# Patient Record
Sex: MALE | Race: WHITE | NOT HISPANIC OR LATINO
[De-identification: names, ages, dates, MRNs, and addresses within clinical notes are randomized per-mention and may not be internally consistent; named-entity substitution may affect disease eponyms.]

---

## 2017-01-03 ENCOUNTER — OTHER (OUTPATIENT)
Age: 62
End: 2017-01-03

## 2017-01-18 ENCOUNTER — OTHER (OUTPATIENT)
Age: 62
End: 2017-01-18

## 2017-01-31 ENCOUNTER — APPOINTMENT (OUTPATIENT)
Dept: COLORECTAL SURGERY | Facility: CLINIC | Age: 62
End: 2017-01-31

## 2017-01-31 VITALS
HEIGHT: 69 IN | DIASTOLIC BLOOD PRESSURE: 71 MMHG | TEMPERATURE: 97.9 F | HEART RATE: 59 BPM | WEIGHT: 153 LBS | SYSTOLIC BLOOD PRESSURE: 116 MMHG | BODY MASS INDEX: 22.66 KG/M2

## 2017-01-31 DIAGNOSIS — K57.80 DIVERTICULITIS OF INTESTINE, PART UNSPECIFIED, WITH PERFORATION AND ABSCESS W/OUT BLEEDING: ICD-10-CM

## 2017-09-07 ENCOUNTER — APPOINTMENT (OUTPATIENT)
Dept: COLORECTAL SURGERY | Facility: CLINIC | Age: 62
End: 2017-09-07
Payer: COMMERCIAL

## 2017-09-07 VITALS
HEART RATE: 52 BPM | HEIGHT: 69 IN | DIASTOLIC BLOOD PRESSURE: 84 MMHG | TEMPERATURE: 98.2 F | RESPIRATION RATE: 16 BRPM | BODY MASS INDEX: 22.96 KG/M2 | SYSTOLIC BLOOD PRESSURE: 188 MMHG | WEIGHT: 155 LBS

## 2017-09-07 DIAGNOSIS — R10.2 PELVIC AND PERINEAL PAIN: ICD-10-CM

## 2017-09-07 PROCEDURE — 46600 DIAGNOSTIC ANOSCOPY SPX: CPT

## 2017-09-07 PROCEDURE — 99214 OFFICE O/P EST MOD 30 MIN: CPT | Mod: 25

## 2017-09-08 ENCOUNTER — OUTPATIENT (OUTPATIENT)
Dept: OUTPATIENT SERVICES | Facility: HOSPITAL | Age: 62
LOS: 1 days | End: 2017-09-08
Payer: COMMERCIAL

## 2017-09-08 ENCOUNTER — APPOINTMENT (OUTPATIENT)
Dept: CT IMAGING | Facility: CLINIC | Age: 62
End: 2017-09-08
Payer: COMMERCIAL

## 2017-09-08 DIAGNOSIS — Z90.49 ACQUIRED ABSENCE OF OTHER SPECIFIED PARTS OF DIGESTIVE TRACT: Chronic | ICD-10-CM

## 2017-09-08 DIAGNOSIS — R10.2 PELVIC AND PERINEAL PAIN: ICD-10-CM

## 2017-09-08 DIAGNOSIS — Z00.8 ENCOUNTER FOR OTHER GENERAL EXAMINATION: ICD-10-CM

## 2017-09-08 PROCEDURE — 74177 CT ABD & PELVIS W/CONTRAST: CPT

## 2017-09-08 PROCEDURE — 82565 ASSAY OF CREATININE: CPT

## 2017-09-08 PROCEDURE — 74177 CT ABD & PELVIS W/CONTRAST: CPT | Mod: 26

## 2017-09-12 ENCOUNTER — OTHER (OUTPATIENT)
Age: 62
End: 2017-09-12

## 2017-09-18 ENCOUNTER — EMERGENCY (EMERGENCY)
Facility: HOSPITAL | Age: 62
LOS: 1 days | Discharge: HOME | End: 2017-09-18
Attending: EMERGENCY MEDICINE | Admitting: EMERGENCY MEDICINE
Payer: SELF-PAY

## 2017-09-18 VITALS
RESPIRATION RATE: 15 BRPM | DIASTOLIC BLOOD PRESSURE: 81 MMHG | TEMPERATURE: 98 F | OXYGEN SATURATION: 96 % | HEART RATE: 62 BPM | SYSTOLIC BLOOD PRESSURE: 149 MMHG

## 2017-09-18 VITALS
WEIGHT: 154.98 LBS | SYSTOLIC BLOOD PRESSURE: 163 MMHG | TEMPERATURE: 98 F | HEART RATE: 63 BPM | HEIGHT: 69 IN | OXYGEN SATURATION: 96 % | RESPIRATION RATE: 16 BRPM | DIASTOLIC BLOOD PRESSURE: 82 MMHG

## 2017-09-18 DIAGNOSIS — S60.511A ABRASION OF RIGHT HAND, INITIAL ENCOUNTER: ICD-10-CM

## 2017-09-18 DIAGNOSIS — E11.9 TYPE 2 DIABETES MELLITUS WITHOUT COMPLICATIONS: ICD-10-CM

## 2017-09-18 DIAGNOSIS — Z90.49 ACQUIRED ABSENCE OF OTHER SPECIFIED PARTS OF DIGESTIVE TRACT: Chronic | ICD-10-CM
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
## 2017-09-18 DIAGNOSIS — M54.5 LOW BACK PAIN: ICD-10-CM

## 2017-09-18 DIAGNOSIS — I10 ESSENTIAL (PRIMARY) HYPERTENSION: ICD-10-CM

## 2017-09-18 DIAGNOSIS — E78.5 HYPERLIPIDEMIA, UNSPECIFIED: ICD-10-CM

## 2017-09-18 DIAGNOSIS — M25.571 PAIN IN RIGHT ANKLE AND JOINTS OF RIGHT FOOT: ICD-10-CM
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
## 2017-09-18 PROCEDURE — 99284 EMERGENCY DEPT VISIT MOD MDM: CPT

## 2017-09-18 PROCEDURE — 73130 X-RAY EXAM OF HAND: CPT | Mod: 26,RT

## 2017-09-18 PROCEDURE — 73610 X-RAY EXAM OF ANKLE: CPT

## 2017-09-18 PROCEDURE — 99284 EMERGENCY DEPT VISIT MOD MDM: CPT | Mod: 25

## 2017-09-18 PROCEDURE — 72100 X-RAY EXAM L-S SPINE 2/3 VWS: CPT | Mod: 26

## 2017-09-18 PROCEDURE — 72100 X-RAY EXAM L-S SPINE 2/3 VWS: CPT

## 2017-09-18 PROCEDURE — 73130 X-RAY EXAM OF HAND: CPT

## 2017-09-18 PROCEDURE — 73610 X-RAY EXAM OF ANKLE: CPT | Mod: 26,RT

## 2017-09-18 NOTE — ED ADULT NURSE NOTE - PMH
Diabetes type 2, uncontrolled  2009  Elective surgery  Implantation of Lumbar Stimulator and Fusion for pain 2013  Hyperlipidemia    Hypertension    Prolapsed lumbar disc    Renal cell cancer  2003  Seizure  series of 3 seizures in 1978 after injection of dye for shoulder

## 2017-09-18 NOTE — ED PROVIDER NOTE - PROGRESS NOTE DETAILS
patient resting comfortably, xray results discussed, patient has no open wounds of 4th finger, no visible foreign body.  advised follow up with his pmd and his pain management doctor.  states has pain medication at home , copy of results given
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
## 2017-09-18 NOTE — ED ADULT NURSE NOTE - PSH
H/O rotator cuff surgery  1978  History of cholecystectomy  2015  History of renal carcinoma  Left Nephrectomy  S/P lumbar spinal fusion
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
## 2017-09-18 NOTE — ED PROVIDER NOTE - UPPER EXTREMITY EXAM, RIGHT
TENDERNESS/right hand tenderness along thenar eminence, from, nvi, no deformity, dry abrasion posterior aspect of right thumb

## 2018-04-06 ENCOUNTER — INPATIENT (INPATIENT)
Facility: HOSPITAL | Age: 63
LOS: 0 days | Discharge: HOME | DRG: 309 | End: 2018-04-07
Attending: FAMILY MEDICINE | Admitting: FAMILY MEDICINE
Payer: COMMERCIAL

## 2018-04-06 VITALS
TEMPERATURE: 98 F | HEART RATE: 116 BPM | OXYGEN SATURATION: 97 % | SYSTOLIC BLOOD PRESSURE: 169 MMHG | HEIGHT: 69 IN | RESPIRATION RATE: 16 BRPM | WEIGHT: 160.06 LBS | DIASTOLIC BLOOD PRESSURE: 94 MMHG

## 2018-04-06 DIAGNOSIS — N17.9 ACUTE KIDNEY FAILURE, UNSPECIFIED: ICD-10-CM

## 2018-04-06 DIAGNOSIS — I48.91 UNSPECIFIED ATRIAL FIBRILLATION: ICD-10-CM

## 2018-04-06 DIAGNOSIS — I10 ESSENTIAL (PRIMARY) HYPERTENSION: ICD-10-CM

## 2018-04-06 DIAGNOSIS — R07.9 CHEST PAIN, UNSPECIFIED: ICD-10-CM

## 2018-04-06 DIAGNOSIS — K59.00 CONSTIPATION, UNSPECIFIED: ICD-10-CM

## 2018-04-06 DIAGNOSIS — E78.5 HYPERLIPIDEMIA, UNSPECIFIED: ICD-10-CM

## 2018-04-06 DIAGNOSIS — Z90.49 ACQUIRED ABSENCE OF OTHER SPECIFIED PARTS OF DIGESTIVE TRACT: Chronic | ICD-10-CM

## 2018-04-06 DIAGNOSIS — E11.65 TYPE 2 DIABETES MELLITUS WITH HYPERGLYCEMIA: ICD-10-CM

## 2018-04-06 DIAGNOSIS — Z29.9 ENCOUNTER FOR PROPHYLACTIC MEASURES, UNSPECIFIED: ICD-10-CM

## 2018-04-06 DIAGNOSIS — M51.26 OTHER INTERVERTEBRAL DISC DISPLACEMENT, LUMBAR REGION: ICD-10-CM

## 2018-04-06 LAB
ALBUMIN SERPL ELPH-MCNC: 3.4 G/DL — SIGNIFICANT CHANGE UP (ref 3.3–5)
ALP SERPL-CCNC: 80 U/L — SIGNIFICANT CHANGE UP (ref 40–120)
ALT FLD-CCNC: 29 U/L — SIGNIFICANT CHANGE UP (ref 12–78)
ANION GAP SERPL CALC-SCNC: 9 MMOL/L — SIGNIFICANT CHANGE UP (ref 5–17)
APTT BLD: 37.9 SEC — HIGH (ref 27.5–37.4)
AST SERPL-CCNC: 18 U/L — SIGNIFICANT CHANGE UP (ref 15–37)
BASOPHILS # BLD AUTO: 0 K/UL — SIGNIFICANT CHANGE UP (ref 0–0.2)
BASOPHILS NFR BLD AUTO: 0.5 % — SIGNIFICANT CHANGE UP (ref 0–2)
BILIRUB SERPL-MCNC: 0.3 MG/DL — SIGNIFICANT CHANGE UP (ref 0.2–1.2)
BUN SERPL-MCNC: 26 MG/DL — HIGH (ref 7–23)
CALCIUM SERPL-MCNC: 8.8 MG/DL — SIGNIFICANT CHANGE UP (ref 8.5–10.1)
CHLORIDE SERPL-SCNC: 106 MMOL/L — SIGNIFICANT CHANGE UP (ref 96–108)
CK MB BLD-MCNC: 1.2 % — SIGNIFICANT CHANGE UP (ref 0–3.5)
CK MB CFR SERPL CALC: 0.7 NG/ML — SIGNIFICANT CHANGE UP (ref 0–3.6)
CK SERPL-CCNC: 58 U/L — SIGNIFICANT CHANGE UP (ref 26–308)
CO2 SERPL-SCNC: 27 MMOL/L — SIGNIFICANT CHANGE UP (ref 22–31)
CREAT SERPL-MCNC: 1.4 MG/DL — HIGH (ref 0.5–1.3)
EOSINOPHIL # BLD AUTO: 0.1 K/UL — SIGNIFICANT CHANGE UP (ref 0–0.5)
EOSINOPHIL NFR BLD AUTO: 1.8 % — SIGNIFICANT CHANGE UP (ref 0–6)
GLUCOSE SERPL-MCNC: 234 MG/DL — HIGH (ref 70–99)
HCT VFR BLD CALC: 50.2 % — HIGH (ref 39–50)
HGB BLD-MCNC: 16.3 G/DL — SIGNIFICANT CHANGE UP (ref 13–17)
INR BLD: 1 RATIO — SIGNIFICANT CHANGE UP (ref 0.88–1.16)
LYMPHOCYTES # BLD AUTO: 1.8 K/UL — SIGNIFICANT CHANGE UP (ref 1–3.3)
LYMPHOCYTES # BLD AUTO: 21.7 % — SIGNIFICANT CHANGE UP (ref 13–44)
MCHC RBC-ENTMCNC: 28.4 PG — SIGNIFICANT CHANGE UP (ref 27–34)
MCHC RBC-ENTMCNC: 32.5 GM/DL — SIGNIFICANT CHANGE UP (ref 32–36)
MCV RBC AUTO: 87.4 FL — SIGNIFICANT CHANGE UP (ref 80–100)
MONOCYTES # BLD AUTO: 0.8 K/UL — SIGNIFICANT CHANGE UP (ref 0–0.9)
MONOCYTES NFR BLD AUTO: 9 % — SIGNIFICANT CHANGE UP (ref 1–9)
NEUTROPHILS # BLD AUTO: 5.5 K/UL — SIGNIFICANT CHANGE UP (ref 1.8–7.4)
NEUTROPHILS NFR BLD AUTO: 66.4 % — SIGNIFICANT CHANGE UP (ref 43–77)
PLATELET # BLD AUTO: 229 K/UL — SIGNIFICANT CHANGE UP (ref 150–400)
POTASSIUM SERPL-MCNC: 4 MMOL/L — SIGNIFICANT CHANGE UP (ref 3.5–5.3)
POTASSIUM SERPL-SCNC: 4 MMOL/L — SIGNIFICANT CHANGE UP (ref 3.5–5.3)
PROT SERPL-MCNC: 7.7 G/DL — SIGNIFICANT CHANGE UP (ref 6–8.3)
PROTHROM AB SERPL-ACNC: 10.9 SEC — SIGNIFICANT CHANGE UP (ref 9.8–12.7)
RBC # BLD: 5.75 M/UL — SIGNIFICANT CHANGE UP (ref 4.2–5.8)
RBC # FLD: 13.2 % — SIGNIFICANT CHANGE UP (ref 10.3–14.5)
SODIUM SERPL-SCNC: 142 MMOL/L — SIGNIFICANT CHANGE UP (ref 135–145)
TROPONIN I SERPL-MCNC: <.015 NG/ML — SIGNIFICANT CHANGE UP (ref 0.01–0.04)
WBC # BLD: 8.2 K/UL — SIGNIFICANT CHANGE UP (ref 3.8–10.5)
WBC # FLD AUTO: 8.2 K/UL — SIGNIFICANT CHANGE UP (ref 3.8–10.5)

## 2018-04-06 PROCEDURE — 71045 X-RAY EXAM CHEST 1 VIEW: CPT | Mod: 26

## 2018-04-06 PROCEDURE — 99285 EMERGENCY DEPT VISIT HI MDM: CPT

## 2018-04-06 PROCEDURE — 93010 ELECTROCARDIOGRAM REPORT: CPT

## 2018-04-06 PROCEDURE — 99223 1ST HOSP IP/OBS HIGH 75: CPT | Mod: AI,GC

## 2018-04-06 RX ORDER — LIDOCAINE HCL 20 MG/ML
5 VIAL (ML) INJECTION ONCE
Qty: 0 | Refills: 0 | Status: DISCONTINUED | OUTPATIENT
Start: 2018-04-06 | End: 2018-04-06

## 2018-04-06 RX ORDER — FENOFIBRATE,MICRONIZED 130 MG
48 CAPSULE ORAL AT BEDTIME
Qty: 0 | Refills: 0 | Status: DISCONTINUED | OUTPATIENT
Start: 2018-04-06 | End: 2018-04-07

## 2018-04-06 RX ORDER — SODIUM CHLORIDE 9 MG/ML
3 INJECTION INTRAMUSCULAR; INTRAVENOUS; SUBCUTANEOUS ONCE
Qty: 0 | Refills: 0 | Status: COMPLETED | OUTPATIENT
Start: 2018-04-06 | End: 2018-04-06

## 2018-04-06 RX ORDER — SODIUM CHLORIDE 9 MG/ML
1000 INJECTION, SOLUTION INTRAVENOUS
Qty: 0 | Refills: 0 | Status: DISCONTINUED | OUTPATIENT
Start: 2018-04-06 | End: 2018-04-07

## 2018-04-06 RX ORDER — POLYETHYLENE GLYCOL 3350 17 G/17G
17 POWDER, FOR SOLUTION ORAL DAILY
Qty: 0 | Refills: 0 | Status: DISCONTINUED | OUTPATIENT
Start: 2018-04-06 | End: 2018-04-07

## 2018-04-06 RX ORDER — DEXTROSE 50 % IN WATER 50 %
12.5 SYRINGE (ML) INTRAVENOUS ONCE
Qty: 0 | Refills: 0 | Status: DISCONTINUED | OUTPATIENT
Start: 2018-04-06 | End: 2018-04-07

## 2018-04-06 RX ORDER — INSULIN LISPRO 100/ML
VIAL (ML) SUBCUTANEOUS
Qty: 0 | Refills: 0 | Status: DISCONTINUED | OUTPATIENT
Start: 2018-04-06 | End: 2018-04-07

## 2018-04-06 RX ORDER — OXYCODONE AND ACETAMINOPHEN 5; 325 MG/1; MG/1
2 TABLET ORAL ONCE
Qty: 0 | Refills: 0 | Status: DISCONTINUED | OUTPATIENT
Start: 2018-04-06 | End: 2018-04-06

## 2018-04-06 RX ORDER — GLUCAGON INJECTION, SOLUTION 0.5 MG/.1ML
1 INJECTION, SOLUTION SUBCUTANEOUS ONCE
Qty: 0 | Refills: 0 | Status: DISCONTINUED | OUTPATIENT
Start: 2018-04-06 | End: 2018-04-07

## 2018-04-06 RX ORDER — DABIGATRAN ETEXILATE MESYLATE 150 MG/1
150 CAPSULE ORAL EVERY 12 HOURS
Qty: 0 | Refills: 0 | Status: DISCONTINUED | OUTPATIENT
Start: 2018-04-07 | End: 2018-04-07

## 2018-04-06 RX ORDER — OXYCODONE HYDROCHLORIDE 5 MG/1
10 TABLET ORAL THREE TIMES A DAY
Qty: 0 | Refills: 0 | Status: DISCONTINUED | OUTPATIENT
Start: 2018-04-06 | End: 2018-04-07

## 2018-04-06 RX ORDER — DEXTROSE 50 % IN WATER 50 %
1 SYRINGE (ML) INTRAVENOUS ONCE
Qty: 0 | Refills: 0 | Status: DISCONTINUED | OUTPATIENT
Start: 2018-04-06 | End: 2018-04-07

## 2018-04-06 RX ORDER — OXYCODONE HYDROCHLORIDE 5 MG/1
30 TABLET ORAL EVERY 12 HOURS
Qty: 0 | Refills: 0 | Status: DISCONTINUED | OUTPATIENT
Start: 2018-04-06 | End: 2018-04-07

## 2018-04-06 RX ORDER — SODIUM CHLORIDE 9 MG/ML
1000 INJECTION INTRAMUSCULAR; INTRAVENOUS; SUBCUTANEOUS ONCE
Qty: 0 | Refills: 0 | Status: COMPLETED | OUTPATIENT
Start: 2018-04-06 | End: 2018-04-06

## 2018-04-06 RX ORDER — ACETAMINOPHEN 500 MG
325 TABLET ORAL EVERY 4 HOURS
Qty: 0 | Refills: 0 | Status: DISCONTINUED | OUTPATIENT
Start: 2018-04-06 | End: 2018-04-07

## 2018-04-06 RX ORDER — SODIUM CHLORIDE 9 MG/ML
1000 INJECTION INTRAMUSCULAR; INTRAVENOUS; SUBCUTANEOUS
Qty: 0 | Refills: 0 | Status: DISCONTINUED | OUTPATIENT
Start: 2018-04-06 | End: 2018-04-07

## 2018-04-06 RX ORDER — INSULIN GLARGINE 100 [IU]/ML
56 INJECTION, SOLUTION SUBCUTANEOUS AT BEDTIME
Qty: 0 | Refills: 0 | Status: DISCONTINUED | OUTPATIENT
Start: 2018-04-06 | End: 2018-04-07

## 2018-04-06 RX ORDER — METOPROLOL TARTRATE 50 MG
100 TABLET ORAL
Qty: 0 | Refills: 0 | Status: DISCONTINUED | OUTPATIENT
Start: 2018-04-06 | End: 2018-04-07

## 2018-04-06 RX ADMIN — SODIUM CHLORIDE 3 MILLILITER(S): 9 INJECTION INTRAMUSCULAR; INTRAVENOUS; SUBCUTANEOUS at 21:36

## 2018-04-06 RX ADMIN — OXYCODONE AND ACETAMINOPHEN 2 TABLET(S): 5; 325 TABLET ORAL at 23:51

## 2018-04-06 RX ADMIN — OXYCODONE AND ACETAMINOPHEN 2 TABLET(S): 5; 325 TABLET ORAL at 22:04

## 2018-04-06 RX ADMIN — SODIUM CHLORIDE 1000 MILLILITER(S): 9 INJECTION INTRAMUSCULAR; INTRAVENOUS; SUBCUTANEOUS at 23:22

## 2018-04-06 NOTE — H&P ADULT - NEGATIVE GENERAL SYMPTOMS
no fatigue/no weight gain/no anorexia/no weight loss/no polyphagia/no polyuria/no polydipsia/no fever/no chills/no malaise

## 2018-04-06 NOTE — H&P ADULT - GASTROINTESTINAL DETAILS
normal/no bruit/no rebound tenderness/no rigidity/soft/nontender/no masses palpable/no distention/no guarding/no organomegaly/bowel sounds normal

## 2018-04-06 NOTE — H&P ADULT - ASSESSMENT
61 y.o male w/PMHx of HTN, DM type 2, HLD, BPH, renal cell carcinoma(s/p L nephrectomy), back pain(s/p disc fusion and spinal card stimulator implant), recent hernia disc c.o abd pain for 3 days admitted for diverticulitis of large intestine with 2.6cm abscess and free peritoneal air confirmed by CT scan in Dr. Ferrara yesterday. 61 y.o male w/PMHx of HTN, DM type 2, HLD on insulin, BPH, renal cell carcinoma(s/p L nephrectomy), back pain(s/p disc fusion and spinal card stimulator implant), herniating disc, diverticulitis s/p bowel perforation in 2016 while vacationing in [place] s/p resection,  last admission to [facility] for abscess 2/2 to diverticulitis came in for CP admitted for R/O ACS vs demand ischemia 2/2 to AFIB RVR

## 2018-04-06 NOTE — ED PROVIDER NOTE - PMH
Atrial fibrillation    Diabetes type 2, uncontrolled  2009  Elective surgery  Implantation of Lumbar Stimulator and Fusion for pain 2013  Hyperlipidemia    Hypertension    Prolapsed lumbar disc    Renal cell cancer  2003  Seizure  series of 3 seizures in 1978 after injection of dye for shoulder

## 2018-04-06 NOTE — H&P ADULT - NEGATIVE GENERAL GENITOURINARY SYMPTOMS
burning sensation of urination no urine discoloration/no flank pain L/no gas in urine/normal libido/normal urinary frequency/no nocturia/no renal colic/no flank pain R/no incontinence/no hematuria/no bladder infections/no dysuria/no urinary hesitancy

## 2018-04-06 NOTE — H&P ADULT - GASTROINTESTINAL COMMENTS
diffuse generalized tenderness more marked b/l lower quadrant and supra pubic area +guarding +rebound

## 2018-04-06 NOTE — H&P ADULT - NEGATIVE GASTROINTESTINAL SYMPTOMS
no melena/no diarrhea/no constipation no nausea/no hematochezia/no steatorrhea/no hiccoughs/no diarrhea/no change in bowel habits/no flatulence/no abdominal pain/no melena/no jaundice/no vomiting

## 2018-04-06 NOTE — ED PROVIDER NOTE - OBJECTIVE STATEMENT
63 yo male with hx htn, dm, hld with hx paroxysmal a fib, c/o palpitations, chest pain and shortness of breath which started an hour ago. Patient states he had chest pressure, radiating to left neck and left arm, with sob. now improved.   patient states that 3 days ago, he felt palpitations/irregular heart rate.   Patient reports hx of a fib in past, for which he was treated at  and [facility] 61 yo male with hx htn, dm, hld with hx paroxysmal a fib, c/o palpitations, chest pain and shortness of breath which started an hour ago. Patient states he had chest pressure, radiating to left neck and left arm, with sob. now improved.   patient states that 3 days ago, he felt palpitations/irregular heart rate.   Patient reports hx of a fib in past, for which he was treated at  and [facility]. Patient unsure if he ever followed up with a cardiologist.   denies fever or chills. denies vomtiing or diarrhea

## 2018-04-06 NOTE — H&P ADULT - CARDIOVASCULAR
negative Regular rate & rhythm, normal S1, S2; no murmurs, gallops or rubs; no S3, S4 detailed exam details…

## 2018-04-06 NOTE — H&P ADULT - PROBLEM SELECTOR PLAN 6
POCT, CC Diet, ISS POCT, CC Diet, ISS, Levemir 56 units HS   -start on LANTUS 56 units HS ( Levemir converts 1/1 with Lantus)

## 2018-04-06 NOTE — H&P ADULT - RS GEN PE MLT RESP DETAILS PC
normal/airway patent/respirations non-labored/no rales/good air movement/no chest wall tenderness/no intercostal retractions/no rhonchi/breath sounds equal/clear to auscultation bilaterally/no wheezes

## 2018-04-06 NOTE — H&P ADULT - NEGATIVE CARDIOVASCULAR SYMPTOMS
no paroxysmal nocturnal dyspnea/no peripheral edema/no claudication/no dyspnea on exertion/no orthopnea

## 2018-04-06 NOTE — H&P ADULT - FAMILY HISTORY
Family history of diabetes mellitus     Family history of lung cancer, mother     Family history of brain tumor     Family history of cerebrovascular accident (CVA) in father     Sibling  Still living? Unknown  Family history of diabetes mellitus in sister, Age at diagnosis: Age Unknown  Family history of hypertension, Age at diagnosis: Age Unknown     Grandparent  Still living? Unknown  Family history of hypertension, Age at diagnosis: Age Unknown

## 2018-04-06 NOTE — H&P ADULT - BACK COMMENTS
Surgical scar @ T5-T8, spinal cord stimulator implant noticeably Surgical scar @ T5-T8, spinal cord stimulator implant noticeably on right hip

## 2018-04-06 NOTE — ED ADULT TRIAGE NOTE - CHIEF COMPLAINT QUOTE
patient states" I am in afib- I took my blood pressure before I left and it was- 190/96mmhg and my heart rate was 112". I felt like I was having indigestion

## 2018-04-06 NOTE — ED ADULT NURSE NOTE - CHPI ED SYMPTOMS NEG
no dizziness/no fever/no chills/no chest pain/no cough/no syncope/no diaphoresis/no shortness of breath/no vomiting/no nausea/no back pain

## 2018-04-06 NOTE — H&P ADULT - PROBLEM SELECTOR PLAN 1
stable   ADMIT to TELE   - s.p Diltiazem 30 mg PO, might need additional dose of Dilt on top of his home metoprolol if he goes into RVR   -c/w Metoprolol tartrate 100 BID  -EKG shows NO ischemia   -Initial set of troponin is NEGATIVE   -start Pradaxa 150 mg BID as epr cardio Dr Gilmore , but pt refused at this time, would like to talk to Dr Ferrara and his sister , would prefer Coumadin   -f/u Echo in am   - maintain mild and gentle hydration for elevated cr with SOLITARY kidney  -f/u Dr Gilmore cardio stable   ADMIT to TELE   - s.p Diltiazem 30 mg PO, might need additional dose of Dilt on top of his home metoprolol if he goes into RVR   -c/w Metoprolol tartrate 100 BID  -EKG shows NO ischemia   -Initial set of troponin is NEGATIVE   -if second set of TROPS is POSITIVE start HEPARIN DRIP as per cardio   -start Pradaxa 150 mg BID as epr cardio Dr Gilmore , but pt refused at this time, would like to talk to Dr Ferrara and his sister , would prefer Coumadin   -f/u Echo in am   - maintain mild and gentle hydration for elevated cr with SOLITARY kidney  -f/u Dr Gilmore cardio stable   ADMIT to TELE   - s.p Diltiazem 30 mg PO, might need additional dose of Dilt on top of his home metoprolol if he goes into RVR   -c/w Metoprolol tartrate 100 BID  -EKG shows NO ischemia   -Initial set of troponin is NEGATIVE   -if second set of TROPS is POSITIVE start HEPARIN DRIP as per cardio   -start Pradaxa 150 mg BID as epr cardio Dr Gilmore , but pt refused at this time, would like to talk to Dr Ferrara and his sister , would prefer Coumadin or Heparin   -started on full dose Lovenox 70mg BID  -f/u Echo in am   - maintain mild and gentle hydration for elevated cr with SOLITARY kidney  -f/u Dr Gilmore cardio

## 2018-04-06 NOTE — ED ADULT NURSE NOTE - PMH
Diabetes type 2, uncontrolled  2009  Elective surgery  Implantation of Lumbar Stimulator and Fusion for pain 2013  Hyperlipidemia    Hypertension    Prolapsed lumbar disc    Renal cell cancer  2003  Seizure  series of 3 seizures in 1978 after injection of dye for shoulder Atrial fibrillation    Diabetes type 2, uncontrolled  2009  Elective surgery  Implantation of Lumbar Stimulator and Fusion for pain 2013  Hyperlipidemia    Hypertension    Prolapsed lumbar disc    Renal cell cancer  2003  Seizure  series of 3 seizures in 1978 after injection of dye for shoulder

## 2018-04-06 NOTE — H&P ADULT - GIT GEN HX ROS MEA POS PC
nausea/vomiting/severe Lower quadrant tenderness B/L and suprapubic area nausea/vomiting/constipation

## 2018-04-06 NOTE — ED ADULT NURSE NOTE - OBJECTIVE STATEMENT
patient with rapid heart rate and elevated BP presenting to ED for care and evaluation, will continue to monitor.

## 2018-04-06 NOTE — H&P ADULT - ATTENDING COMMENTS
Long conversation had with patient regarding various anticoagulation options. Patient wants to consider his options but is leaning towards coumadin. Pt to get full dose lovenox for now.

## 2018-04-06 NOTE — CONSULT NOTE ADULT - ASSESSMENT
The patient is a 62 year old male with a history of HTN, HL, DM, chronic back pain, paroxysmal atrial fibrillation who presents with chest pain, shortness of breath, palpitations.    Plan:  - HR currently around . Patient took his evening dose of metoprolol. Will give one dose of diltiazem 30 mg PO and re-assess in am if he needs additional diltiazem in addition to his home metoprolol dose.  - Continue metoprolol tartrate 100 mg bid  - ECG with no evidence of ischemia or infarction although chest pain concerning. Rule out acute MI with two sets of cardiac enzymes.  - If enzymes negative, will start dabigatran 150 mg bid in the morning. If enzymes positive, start heparin drip. Patient will need long-term anticoagulation for thromboprophylaxis given [id number] of at least 2.  - Patient notes intolerance to statins. Can continue fenofibrate if needed.  - Check echocardiogram  - Cr noted mildly elevated. Continue IVF.  - Await lab work

## 2018-04-06 NOTE — H&P ADULT - NSHPLABSRESULTS_GEN_ALL_CORE
16.3   8.2   )-----------( 229      ( 06 Apr 2018 21:47 )             50.2     06 Apr 2018 21:47    142    |  106    |  26     ----------------------------<  234    4.0     |  27     |  1.40     Ca    8.8        06 Apr 2018 21:47    TPro  7.7    /  Alb  3.4    /  TBili  0.3    /  DBili  x      /  AST  18     /  ALT  29     /  AlkPhos  80     06 Apr 2018 21:47    LIVER FUNCTIONS - ( 06 Apr 2018 21:47 )  Alb: 3.4 g/dL / Pro: 7.7 g/dL / ALK PHOS: 80 U/L / ALT: 29 U/L / AST: 18 U/L / GGT: x           PT/INR - ( 06 Apr 2018 21:47 )   PT: 10.9 sec;   INR: 1.00 ratio         PTT - ( 06 Apr 2018 21:47 )  PTT:37.9 sec  CAPILLARY BLOOD GLUCOSE        CARDIAC MARKERS ( 06 Apr 2018 21:47 )  <.015 ng/mL / x     / 58 U/L / x     / 0.7 ng/mL

## 2018-04-06 NOTE — H&P ADULT - HISTORY OF PRESENT ILLNESS
61 y.o male w/PMHx of HTN, DM type 2, HLD, BPH, renal cell carcinoma(s/p L nephrectomy), back pain(s/p disc fusion and spinal card stimulator implant), recent hernia disc c.o abd pain for 3 days. The pain is nonradiated constant stabbing like pain 9-10/10 @ the Lower quadrants of the abd B/L. The pain first started at Wednesday when Pt was having vocation in [place]. He was getting out of pool and then feel the pain and had to sit on the floor due to the severe of the pain. The doctor in [place] told him it is possible be gas that cause the pain. He was taking Oxycodone for temporary pain relieve and helped him to fall into sleep. Pt was diagnosed with diverticulitis with 2.16 cm abscesses by CT by Dr. Alem foss. The pain is also associate with nausea and non-bilious vomiting, reports chills, loss of appetite, and burning of urination. Denies of diarrhea/constipation/melena/blood in the stool. Last BM yesterday.    ED: afebrile, mild leukocytosis, elevated Cr. CXR :mild bundle atelectases of L lung.  received one bolus, starts on ertapenem 61 y.o male w/PMHx of HTN, DM type 2, HLD on insulin, BPH, renal cell carcinoma(s/p L nephrectomy), back pain(s/p disc fusion and spinal card stimulator implant), herniating disc, diverticulitis s/p bowel perforation in 2016 while vacationing in [place] s/p resection,  last admission [facility] for abscess 2/2 to diverticulitis came in for CP . Pt states that today while watching TV he started having chest pain and chest pressure that made him nauseous and sweaty. The pain was 5/10 and was raditing into his neck and left arm rigtht until above the olecranon and was associated with feeling of fluttering in the chest. He took his BP at that time and it was 196/112 and his HR was 122. he called Dr Ferrara that advised him to just go to the hospital. Of note, pt mentiones that prev to this episode, on Tue, while he was sleeping he woke up dry heaving, sweating, with chest pain and palpitations, but went back to bed. Denies abd pain, gastritis, epigastric discomfort, gas, recent pulmonary inflections, trauma to the sternum or thoracic wall, calf pain, claudication dysuria, chills or fever. Denies recent travel, sick contacts or not moving for long periods of time.   ED: /94 down to 133/74 after diltiazem 30 mg PO,  down to 91, afebrile, T97, s/p 1L NS, diltiazem 30 mg PO   TROP 1 NEGATIVE

## 2018-04-06 NOTE — ED PROVIDER NOTE - PROGRESS NOTE DETAILS
patient reports allergy to aspirin, so no aspirin given  patient with continued episodes of "not feeling right"; no clear time of onset for this afib patient evaluated by cardiologist Dr. Martin  will admit to tele, serial enzymes, echo darnell  Dr. Martin to start patient on PRADAXA

## 2018-04-06 NOTE — H&P ADULT - PROBLEM SELECTOR PLAN 2
likely demand ischemia from RVR AFIB  - s.p Diltiazem 30 mg PO, might need additional dose of Dilt on top of his home metoprolol if he goes into RVR   -f/u Dr Gilmore cardio  -c/w Metoprolol tartrate 100 BID  -EKG shows NO ischemia   -Initial set of troponin is NEGATIVE   -start Pradaxa 150 mg BID as epr cardio Dr Gilmore , but pt refused at this time, would like to talk to Dr Ferrara and his sister , would prefer Coumadin   -f/u Echo in am likely demand ischemia from RVR AFIB  - s.p Diltiazem 30 mg PO, might need additional dose of Dilt on top of his home metoprolol if he goes into RVR   -f/u Dr Gilmore cardio  -c/w Metoprolol tartrate 100 BID  -EKG shows NO ischemia   -Initial set of troponin is NEGATIVE   -start full dose Lovenox   -f/u Echo in am  f/u Dr Gilmore

## 2018-04-06 NOTE — CONSULT NOTE ADULT - SUBJECTIVE AND OBJECTIVE BOX
History of Present Illness: The patient is a 62 year old male with a history of HTN, HL, DM, chronic back pain, paroxysmal atrial fibrillation who presents with chest pain, shortness of breath, palpitations. He states he was sitting down watching TV a little while ago when the chest pressure started with throat tightness, some left arm cramping. He noted similar symptoms on Tuesday. There was associated shortness of breath and palpitations. He also notes just feeling unwell and nauseous over the past week. He states he had been tried on anticoagulation with rivaroxaban and maybe apixaban and had an allergy to it (he is unsure what the allergy was). He also notes similar reaction to aspirin, but again, unsure of reaction. He has not followed up with a cardiologist and is unsure who he had seen in the past.    Past Medical/Surgical History:  HTN, HL, DM, chronic back pain, paroxysmal atrial fibrillation    Medications:  Home Medications:  Corwin 10 mg-40 mg oral tablet: 1 tab(s) orally once a day (06 Apr 2018 20:19)  fenofibrate 40 mg oral tablet: 1 tab(s) orally once a day (06 Apr 2018 20:19)  Levemir 100 units/mL subcutaneous solution: 56 unit(s) subcutaneous once a day (in the evening) (06 Apr 2018 20:19)  metoprolol tartrate 100 mg oral tablet: 1 tab(s) orally 2 times a day (06 Apr 2018 20:19)  oxyCODONE 10 mg oral tablet: 1 tab(s) orally 3 times a day (06 Apr 2018 20:19)  OxyCONTIN 30 mg oral tablet, extended release: 1 tab(s) orally every 12 hours (06 Apr 2018 20:19)      Family History: Non-contributory family history of premature cardiovascular atherosclerotic disease    Social History: No tobacco, alcohol or drug use    Review of Systems:  General: No fevers, chills, weight loss or gain  Skin: No rashes, color changes  Cardiovascular: No chest pain, orthopnea  Respiratory: No shortness of breath, cough  Gastrointestinal: No nausea, abdominal pain  Genitourinary: No incontinence, pain with urination  Musculoskeletal: No pain, swelling, decreased range of motion  Neurological: No headache, weakness  Psychiatric: No depression, anxiety  Endocrine: No weight loss or gain, increased thirst  All other systems are comprehensively negative.    Physical Exam:  Vitals:        Vital Signs Last 24 Hrs  T(C): 36.7 (06 Apr 2018 20:10), Max: 36.7 (06 Apr 2018 20:10)  T(F): 98 (06 Apr 2018 20:10), Max: 98 (06 Apr 2018 20:10)  HR: 91 (06 Apr 2018 21:40) (91 - 116)  BP: 133/74 (06 Apr 2018 21:40) (133/74 - 169/94)  BP(mean): --  RR: 20 (06 Apr 2018 21:40) (16 - 20)  SpO2: 96% (06 Apr 2018 21:40) (96% - 97%)  General: NAD  HEENT: MMM  Neck: No JVD, no carotid bruit  Lungs: CTAB  CV: RRR, nl S1/S2, no M/R/G  Abdomen: S/NT/ND, +BS  Extremities: No LE edema, no cyanosis  Neuro: AAOx3, non-focal  Skin: No rash    Labs:                        16.3   8.2   )-----------( 229      ( 06 Apr 2018 21:47 )             50.2     04-06    142  |  106  |  26<H>  ----------------------------<  234<H>  4.0   |  27  |  1.40<H>    Ca    8.8      06 Apr 2018 21:47    TPro  7.7  /  Alb  3.4  /  TBili  0.3  /  DBili  x   /  AST  18  /  ALT  29  /  AlkPhos  x   04-06    CARDIAC MARKERS ( 06 Apr 2018 21:47 )  <.015 ng/mL / x     / x     / x     / x          PT/INR - ( 06 Apr 2018 21:47 )   PT: 10.9 sec;   INR: 1.00 ratio         PTT - ( 06 Apr 2018 21:47 )  PTT:37.9 sec    ECG: AF, normal axis, nonspecific ST abnormality

## 2018-04-06 NOTE — H&P ADULT - PROBLEM SELECTOR PLAN 3
elevated BUN/cr   baseline cr 1.7   SOLITARY kidney s/p nephrectomy for RCC  c/w gentle hydration @ 100 cc/hr   f/u renal consult Dr Jiang grp in am   f/u labs BMP in am elevated BUN/cr   baseline cr 1.7   SOLITARY kidney s/p nephrectomy for RCC  c/w gentle hydration @ 100 cc/hr   hold Willi for the sartan , cover HTN with dilt if necessary   f/u renal consult Dr Jiang grp in am   f/u labs BMP in am elevated BUN/cr - might be dehydration cause HCT is elevated @ 50 (no hx of FAUSTO) vs hypoxia 2/2 to RVR AFIB   baseline cr 1.7   SOLITARY kidney s/p nephrectomy for RCC  c/w gentle hydration @ 100 cc/hr   hold Willi for the sartan , cover HTN with dilt if necessary   f/u renal consult Dr Jiang grp in am   f/u labs BMP in am

## 2018-04-06 NOTE — H&P ADULT - CONSTITUTIONAL DETAILS
distress due to pain well-developed/distress due to pain/well-groomed/back pain >>>chest pain/well-nourished

## 2018-04-06 NOTE — H&P ADULT - NSHPSOCIALHISTORY_GEN_ALL_CORE
works as superintendent   lives at home with wife    ambulates freely   no tabcco quit   etoh occasional  no drugs

## 2018-04-07 ENCOUNTER — TRANSCRIPTION ENCOUNTER (OUTPATIENT)
Age: 63
End: 2018-04-07

## 2018-04-07 LAB
BASOPHILS # BLD AUTO: 0 K/UL — SIGNIFICANT CHANGE UP (ref 0–0.2)
BASOPHILS NFR BLD AUTO: 0.5 % — SIGNIFICANT CHANGE UP (ref 0–2)
EOSINOPHIL # BLD AUTO: 0.2 K/UL — SIGNIFICANT CHANGE UP (ref 0–0.5)
EOSINOPHIL NFR BLD AUTO: 2.7 % — SIGNIFICANT CHANGE UP (ref 0–6)
HBA1C BLD-MCNC: 10.8 % — HIGH (ref 4–5.6)
HCT VFR BLD CALC: 50.9 % — HIGH (ref 39–50)
HGB BLD-MCNC: 16.4 G/DL — SIGNIFICANT CHANGE UP (ref 13–17)
LYMPHOCYTES # BLD AUTO: 2.1 K/UL — SIGNIFICANT CHANGE UP (ref 1–3.3)
LYMPHOCYTES # BLD AUTO: 28.8 % — SIGNIFICANT CHANGE UP (ref 13–44)
MCHC RBC-ENTMCNC: 27.9 PG — SIGNIFICANT CHANGE UP (ref 27–34)
MCHC RBC-ENTMCNC: 32.3 GM/DL — SIGNIFICANT CHANGE UP (ref 32–36)
MCV RBC AUTO: 86.6 FL — SIGNIFICANT CHANGE UP (ref 80–100)
MONOCYTES # BLD AUTO: 0.8 K/UL — SIGNIFICANT CHANGE UP (ref 0–0.9)
MONOCYTES NFR BLD AUTO: 10.4 % — HIGH (ref 1–9)
NEUTROPHILS # BLD AUTO: 3.9 K/UL — SIGNIFICANT CHANGE UP (ref 1.8–7.4)
NEUTROPHILS NFR BLD AUTO: 57.4 % — SIGNIFICANT CHANGE UP (ref 43–77)
PLATELET # BLD AUTO: 221 K/UL — SIGNIFICANT CHANGE UP (ref 150–400)
RBC # BLD: 5.88 M/UL — HIGH (ref 4.2–5.8)
RBC # FLD: 13.1 % — SIGNIFICANT CHANGE UP (ref 10.3–14.5)
TROPONIN I SERPL-MCNC: <.015 NG/ML — SIGNIFICANT CHANGE UP (ref 0.01–0.04)
WBC # BLD: 7.3 K/UL — SIGNIFICANT CHANGE UP (ref 3.8–10.5)
WBC # FLD AUTO: 7.3 K/UL — SIGNIFICANT CHANGE UP (ref 3.8–10.5)

## 2018-04-07 PROCEDURE — 99239 HOSP IP/OBS DSCHRG MGMT >30: CPT

## 2018-04-07 PROCEDURE — 85027 COMPLETE CBC AUTOMATED: CPT

## 2018-04-07 PROCEDURE — 36415 COLL VENOUS BLD VENIPUNCTURE: CPT

## 2018-04-07 PROCEDURE — 85610 PROTHROMBIN TIME: CPT

## 2018-04-07 PROCEDURE — 82553 CREATINE MB FRACTION: CPT

## 2018-04-07 PROCEDURE — 82550 ASSAY OF CK (CPK): CPT

## 2018-04-07 PROCEDURE — 93306 TTE W/DOPPLER COMPLETE: CPT | Mod: 26

## 2018-04-07 PROCEDURE — 93306 TTE W/DOPPLER COMPLETE: CPT

## 2018-04-07 PROCEDURE — 84484 ASSAY OF TROPONIN QUANT: CPT

## 2018-04-07 PROCEDURE — 80053 COMPREHEN METABOLIC PANEL: CPT

## 2018-04-07 PROCEDURE — 71045 X-RAY EXAM CHEST 1 VIEW: CPT

## 2018-04-07 PROCEDURE — 96372 THER/PROPH/DIAG INJ SC/IM: CPT

## 2018-04-07 PROCEDURE — 80048 BASIC METABOLIC PNL TOTAL CA: CPT

## 2018-04-07 PROCEDURE — 83036 HEMOGLOBIN GLYCOSYLATED A1C: CPT

## 2018-04-07 PROCEDURE — 93005 ELECTROCARDIOGRAM TRACING: CPT

## 2018-04-07 PROCEDURE — 82962 GLUCOSE BLOOD TEST: CPT

## 2018-04-07 PROCEDURE — 99285 EMERGENCY DEPT VISIT HI MDM: CPT | Mod: 25

## 2018-04-07 PROCEDURE — 85730 THROMBOPLASTIN TIME PARTIAL: CPT

## 2018-04-07 RX ORDER — ENOXAPARIN SODIUM 100 MG/ML
70 INJECTION SUBCUTANEOUS
Qty: 0 | Refills: 0 | Status: DISCONTINUED | OUTPATIENT
Start: 2018-04-07 | End: 2018-04-07

## 2018-04-07 RX ORDER — FENOFIBRATE,MICRONIZED 130 MG
1 CAPSULE ORAL
Qty: 0 | Refills: 0 | COMMUNITY

## 2018-04-07 RX ORDER — POTASSIUM CHLORIDE 20 MEQ
40 PACKET (EA) ORAL ONCE
Qty: 0 | Refills: 0 | Status: COMPLETED | OUTPATIENT
Start: 2018-04-07 | End: 2018-04-07

## 2018-04-07 RX ORDER — INSULIN GLARGINE 100 [IU]/ML
50 INJECTION, SOLUTION SUBCUTANEOUS ONCE
Qty: 0 | Refills: 0 | Status: COMPLETED | OUTPATIENT
Start: 2018-04-07 | End: 2018-04-07

## 2018-04-07 RX ORDER — DABIGATRAN ETEXILATE MESYLATE 150 MG/1
150 CAPSULE ORAL EVERY 12 HOURS
Qty: 0 | Refills: 0 | Status: DISCONTINUED | OUTPATIENT
Start: 2018-04-07 | End: 2018-04-07

## 2018-04-07 RX ORDER — DABIGATRAN ETEXILATE MESYLATE 150 MG/1
1 CAPSULE ORAL
Qty: 60 | Refills: 0
Start: 2018-04-07 | End: 2018-05-06

## 2018-04-07 RX ADMIN — OXYCODONE HYDROCHLORIDE 10 MILLIGRAM(S): 5 TABLET ORAL at 05:34

## 2018-04-07 RX ADMIN — ENOXAPARIN SODIUM 70 MILLIGRAM(S): 100 INJECTION SUBCUTANEOUS at 05:34

## 2018-04-07 RX ADMIN — OXYCODONE HYDROCHLORIDE 10 MILLIGRAM(S): 5 TABLET ORAL at 13:46

## 2018-04-07 RX ADMIN — POLYETHYLENE GLYCOL 3350 17 GRAM(S): 17 POWDER, FOR SOLUTION ORAL at 13:46

## 2018-04-07 RX ADMIN — INSULIN GLARGINE 50 UNIT(S): 100 INJECTION, SOLUTION SUBCUTANEOUS at 00:49

## 2018-04-07 RX ADMIN — OXYCODONE HYDROCHLORIDE 30 MILLIGRAM(S): 5 TABLET ORAL at 05:34

## 2018-04-07 RX ADMIN — SODIUM CHLORIDE 100 MILLILITER(S): 9 INJECTION INTRAMUSCULAR; INTRAVENOUS; SUBCUTANEOUS at 03:58

## 2018-04-07 RX ADMIN — Medication 2: at 11:23

## 2018-04-07 RX ADMIN — Medication 100 MILLIGRAM(S): at 05:34

## 2018-04-07 RX ADMIN — Medication 40 MILLIEQUIVALENT(S): at 14:55

## 2018-04-07 NOTE — DISCHARGE NOTE ADULT - CARE PROVIDER_API CALL
Nhan Ferrara (DO), Family Medicine  [facility] [facility]  [address]  [place] [address]  Phone: [phone]  Fax: [phone]    Yariel Dickson), Internal Medicine  [address] [place] [address]  Phone: [phone]  Fax: [phone]

## 2018-04-07 NOTE — PROGRESS NOTE ADULT - SUBJECTIVE AND OBJECTIVE BOX
Chief Complaint: Palpitations, chest pain, shortness of breath    Interval Events: Converted to sinus rhythm overnight. No palpitations currently.    Review of Systems:  General: No fevers, chills, weight loss or gain  Skin: No rashes, color changes  Cardiovascular: No chest pain, orthopnea  Respiratory: No shortness of breath, cough  Gastrointestinal: No nausea, abdominal pain  Genitourinary: No incontinence, pain with urination  Musculoskeletal: No pain, swelling, decreased range of motion  Neurological: No headache, weakness  Psychiatric: No depression, anxiety  Endocrine: No weight loss or gain, increased thirst  All other systems are comprehensively negative.    Physical Exam:  Vitals:        Vital Signs Last 24 Hrs  T(C): 36.4 (07 Apr 2018 07:50), Max: 36.7 (06 Apr 2018 20:10)  T(F): 97.5 (07 Apr 2018 07:50), Max: 98 (06 Apr 2018 20:10)  HR: 68 (07 Apr 2018 07:50) (68 - 116)  BP: 154/88 (07 Apr 2018 07:50) (113/75 - 169/94)  BP(mean): --  RR: 18 (07 Apr 2018 07:50) (16 - 20)  SpO2: 96% (07 Apr 2018 07:50) (95% - 97%)  General: NAD  HEENT: MMM  Neck: No JVD, no carotid bruit  Lungs: CTAB  CV: RRR, nl S1/S2, no M/R/G  Abdomen: S/NT/ND, +BS  Extremities: No LE edema, no cyanosis  Neuro: AAOx3, non-focal  Skin: No rash    Labs:                        16.4   7.3   )-----------( 221      ( 07 Apr 2018 06:24 )             50.9     04-07    143  |  109<H>  |  19  ----------------------------<  113<H>  3.1<L>   |  26  |  1.20    Ca    8.2<L>      07 Apr 2018 06:26    TPro  7.7  /  Alb  3.4  /  TBili  0.3  /  DBili  x   /  AST  18  /  ALT  29  /  AlkPhos  80  04-06    CARDIAC MARKERS ( 07 Apr 2018 06:26 )  <.015 ng/mL / x     / x     / x     / x      CARDIAC MARKERS ( 06 Apr 2018 21:47 )  <.015 ng/mL / x     / 58 U/L / x     / 0.7 ng/mL      PT/INR - ( 06 Apr 2018 21:47 )   PT: 10.9 sec;   INR: 1.00 ratio         PTT - ( 06 Apr 2018 21:47 )  PTT:37.9 sec    Telemetry: AF -> sinus rhythm

## 2018-04-07 NOTE — DISCHARGE NOTE ADULT - CARE PLAN
Principal Discharge DX:	Chest pain, unspecified type  Goal:	resolution  Assessment and plan of treatment:	-this improved during your stay, you were monitored on telemetry monitoring and noted to be in afib now rate controlled, your blood work was negative for acute coronary syndrome, you were seen by Dr. Martin cardiology and optimized to be discharged on pradaxa with close follow up with your cardiologist Dr. Dickson  Secondary Diagnosis:	Chronic atrial fibrillation  Goal:	stable  Assessment and plan of treatment:	-as above  -cont pradaxa and metoprolol as indicated  -follow up with your primary doctor (pmd) and cardiologist within 1 week of discharge  Secondary Diagnosis:	Uncontrolled type 2 diabetes mellitus without complication, with long-term current use of insulin  Goal:	stable  Assessment and plan of treatment:	-cont home meds, your A1C was 10.8  -follow up with Dr. Ferrara your primary medical doctor and outpt endocrinologist if needed for control of your diabetes  Secondary Diagnosis:	Prolapsed lumbar disc  Goal:	stable  Assessment and plan of treatment:	-cont home meds  -follow up with your outpt orthopaedist and pmd as needed

## 2018-04-07 NOTE — DISCHARGE NOTE ADULT - ADDITIONAL INSTRUCTIONS
-follow up with your primary medical physician Dr. Ferrara and primary cardiologist Dr. Dickson within 1 week of discharge  -please follow up with your primary medical physician or cardiologist on perhaps helping you a prior authorization for insurance coverage of your new medication pradaxa  -pt to setup all follow up appts, pt agrees to purchasing pradaxa for now

## 2018-04-07 NOTE — PROGRESS NOTE ADULT - ASSESSMENT
The patient is a 62 year old male with a history of HTN, HL, DM, chronic back pain, paroxysmal atrial fibrillation who presents with chest pain, shortness of breath, palpitations.    Plan:  - HR currently in 50s in sinus rhythm  - Continue metoprolol tartrate 100 mg bid. Would not add additional rate controlling agents at this time.  - ECG with no evidence of ischemia or infarction. Acute MI ruled out with serial cardiac enzymes.  - Patient notes intolerance to statins. Can continue fenofibrate if needed.  - Echocardiogram pending  - Cr improved with IVF  - Given allergy to rivaroxaban and possibly apixaban, start dabigatran 150 mg bid  - Discontinue enoxaparin  - Ok for discharge from a cardiac perspective

## 2018-04-07 NOTE — DISCHARGE NOTE ADULT - HOSPITAL COURSE
61 y.o male w/PMHx of HTN, DM type 2, HLD on insulin, BPH, renal cell carcinoma(s/p L nephrectomy), back pain(s/p disc fusion and spinal card stimulator implant), herniating disc, diverticulitis s/p bowel perforation in 2016 while vacationing in [place] s/p resection,  last admission to [facility] for abscess 2/2 to diverticulitis presented this admission for  CP admitted for R/O ACS vs demand ischemia 2/2 to AFIB RVR. Pt was monitored on telemetry and cardio evaluated pt, pt ruled out ACS and was rate controlled. Pt was recommended to start pradaxa, pt agreed to go on pradaxa vs coumdadin, pt states he had a generalized rash when on xarelto. Pt was cardiologically optimized for discharge with close follow up with pmd and cardio, Terry Ferrara and Yessi respectively. Pt improved and stable for discharge home with close follow up.     Time spent: 55 minutes  PMD answering service made aware of discharge

## 2018-04-07 NOTE — DISCHARGE NOTE ADULT - MEDICATION SUMMARY - MEDICATIONS TO TAKE
I will START or STAY ON the medications listed below when I get home from the hospital:    OxyCONTIN 30 mg oral tablet, extended release  -- 1 tab(s) by mouth every 12 hours  -- Indication: For Prolapsed lumbar disc    oxyCODONE 10 mg oral tablet  -- 1 tab(s) by mouth 3 times a day  -- Indication: For Prolapsed lumbar disc    dabigatran 150 mg oral capsule  -- 1 cap(s) by mouth 2 times a day   -- Do not chew, break, or crush.  It is very important that you take or use this exactly as directed.  Do not skip doses or discontinue unless directed by your doctor.  Obtain medical advice before taking any non-prescription drugs as some may affect the action of this medication.  Swallow whole.  Do not crush.    -- Indication: For Atrial fibrillation    NovoLOG 100 units/mL injectable solution  -- 10 unit(s) injectable 3 times a day (after meals)  -- Indication: For Diabetes type 2, uncontrolled    Levemir 100 units/mL subcutaneous solution  -- 56 unit(s) subcutaneous once a day (in the evening)  -- Indication: For Diabetes type 2, uncontrolled    Corwin 10 mg-40 mg oral tablet  -- 1 tab(s) by mouth once a day (at bedtime)  -- Indication: For Hypertension    metoprolol tartrate 100 mg oral tablet  -- 1 tab(s) by mouth 2 times a day  -- Indication: For Atrial fibrillation

## 2018-04-07 NOTE — DISCHARGE NOTE ADULT - PATIENT PORTAL LINK FT
You can access the [facility], offered by [facility], by registering with the following website: [email or web address]

## 2018-04-07 NOTE — DISCHARGE NOTE ADULT - SECONDARY DIAGNOSIS.
Chronic atrial fibrillation Uncontrolled type 2 diabetes mellitus without complication, with long-term current use of insulin Prolapsed lumbar disc

## 2018-04-07 NOTE — DISCHARGE NOTE ADULT - PLAN OF CARE
resolution -this improved during your stay, you were monitored on telemetry monitoring and noted to be in afib now rate controlled, your blood work was negative for acute coronary syndrome, you were seen by Dr. Mratin cardiology and optimized to be discharged on pradaxa with close follow up with your cardiologist Dr. Dickson stable -as above  -cont pradaxa and metoprolol as indicated  -follow up with your primary doctor (pmd) and cardiologist within 1 week of discharge -cont home meds, your A1C was 10.8  -follow up with Dr. Ferrara your primary medical doctor and outpt endocrinologist if needed for control of your diabetes -cont home meds  -follow up with your outpt orthopaedist and pmd as needed

## 2018-04-08 VITALS
SYSTOLIC BLOOD PRESSURE: 128 MMHG | DIASTOLIC BLOOD PRESSURE: 81 MMHG | TEMPERATURE: 98 F | HEART RATE: 66 BPM | OXYGEN SATURATION: 97 % | RESPIRATION RATE: 17 BRPM

## 2018-04-11 ENCOUNTER — NON-APPOINTMENT (OUTPATIENT)
Age: 63
End: 2018-04-11

## 2018-04-11 ENCOUNTER — APPOINTMENT (OUTPATIENT)
Dept: CARDIOLOGY | Facility: CLINIC | Age: 63
End: 2018-04-11
Payer: COMMERCIAL

## 2018-04-11 VITALS — DIASTOLIC BLOOD PRESSURE: 70 MMHG | SYSTOLIC BLOOD PRESSURE: 138 MMHG

## 2018-04-11 VITALS
OXYGEN SATURATION: 91 % | SYSTOLIC BLOOD PRESSURE: 149 MMHG | BODY MASS INDEX: 24.59 KG/M2 | HEART RATE: 55 BPM | DIASTOLIC BLOOD PRESSURE: 76 MMHG | HEIGHT: 69 IN | WEIGHT: 166 LBS

## 2018-04-11 DIAGNOSIS — R07.9 CHEST PAIN, UNSPECIFIED: ICD-10-CM

## 2018-04-11 PROCEDURE — 93000 ELECTROCARDIOGRAM COMPLETE: CPT

## 2018-04-11 PROCEDURE — 99215 OFFICE O/P EST HI 40 MIN: CPT

## 2018-04-11 RX ORDER — RIVAROXABAN 20 MG/1
20 TABLET, FILM COATED ORAL
Qty: 30 | Refills: 3 | Status: ACTIVE | COMMUNITY
Start: 2018-04-11 | End: 1900-01-01

## 2018-04-11 RX ORDER — OXYCODONE 10 MG/1
10 TABLET ORAL
Refills: 0 | Status: ACTIVE | COMMUNITY
Start: 2018-04-11

## 2018-04-27 ENCOUNTER — APPOINTMENT (OUTPATIENT)
Dept: CARDIOLOGY | Facility: CLINIC | Age: 63
End: 2018-04-27
Payer: COMMERCIAL

## 2018-04-27 PROCEDURE — 93015 CV STRESS TEST SUPVJ I&R: CPT

## 2018-04-27 PROCEDURE — A9500: CPT

## 2018-04-27 PROCEDURE — 78452 HT MUSCLE IMAGE SPECT MULT: CPT

## 2018-07-06 ENCOUNTER — NON-APPOINTMENT (OUTPATIENT)
Age: 63
End: 2018-07-06

## 2018-07-06 ENCOUNTER — APPOINTMENT (OUTPATIENT)
Dept: CARDIOLOGY | Facility: CLINIC | Age: 63
End: 2018-07-06
Payer: COMMERCIAL

## 2018-07-06 VITALS
DIASTOLIC BLOOD PRESSURE: 85 MMHG | SYSTOLIC BLOOD PRESSURE: 196 MMHG | WEIGHT: 163 LBS | OXYGEN SATURATION: 97 % | BODY MASS INDEX: 24.14 KG/M2 | HEIGHT: 69 IN | HEART RATE: 50 BPM

## 2018-07-06 PROCEDURE — 93000 ELECTROCARDIOGRAM COMPLETE: CPT

## 2018-07-06 PROCEDURE — 99215 OFFICE O/P EST HI 40 MIN: CPT

## 2019-03-12 ENCOUNTER — NON-APPOINTMENT (OUTPATIENT)
Age: 64
End: 2019-03-12

## 2019-03-12 ENCOUNTER — APPOINTMENT (OUTPATIENT)
Dept: CARDIOLOGY | Facility: CLINIC | Age: 64
End: 2019-03-12
Payer: COMMERCIAL

## 2019-03-12 VITALS
SYSTOLIC BLOOD PRESSURE: 152 MMHG | WEIGHT: 161 LBS | OXYGEN SATURATION: 96 % | HEART RATE: 49 BPM | DIASTOLIC BLOOD PRESSURE: 84 MMHG | BODY MASS INDEX: 23.78 KG/M2

## 2019-03-12 PROCEDURE — 93000 ELECTROCARDIOGRAM COMPLETE: CPT

## 2019-03-12 PROCEDURE — 99215 OFFICE O/P EST HI 40 MIN: CPT

## 2019-03-21 ENCOUNTER — APPOINTMENT (OUTPATIENT)
Dept: UROLOGY | Facility: CLINIC | Age: 64
End: 2019-03-21
Payer: COMMERCIAL

## 2019-03-21 DIAGNOSIS — R97.20 ELEVATED PROSTATE, SPECIFIC ANTIGEN [PSA]: ICD-10-CM

## 2019-03-21 DIAGNOSIS — Z86.39 PERSONAL HISTORY OF OTHER ENDOCRINE, NUTRITIONAL AND METABOLIC DISEASE: ICD-10-CM

## 2019-03-21 DIAGNOSIS — N13.8 BENIGN PROSTATIC HYPERPLASIA WITH LOWER URINARY TRACT SYMPMS: ICD-10-CM

## 2019-03-21 DIAGNOSIS — N40.1 BENIGN PROSTATIC HYPERPLASIA WITH LOWER URINARY TRACT SYMPMS: ICD-10-CM

## 2019-03-21 DIAGNOSIS — C64.9 MALIGNANT NEOPLASM OF UNSPECIFIED KIDNEY, EXCEPT RENAL PELVIS: ICD-10-CM

## 2019-03-21 PROCEDURE — 99244 OFF/OP CNSLTJ NEW/EST MOD 40: CPT

## 2019-03-22 LAB
PSA FREE FLD-MCNC: 32 %
PSA FREE SERPL-MCNC: 1.92 NG/ML
PSA SERPL-MCNC: 5.95 NG/ML
URINE CYTOLOGY: NORMAL

## 2019-04-18 ENCOUNTER — APPOINTMENT (OUTPATIENT)
Dept: ELECTROPHYSIOLOGY | Facility: CLINIC | Age: 64
End: 2019-04-18
Payer: COMMERCIAL

## 2019-04-18 ENCOUNTER — NON-APPOINTMENT (OUTPATIENT)
Age: 64
End: 2019-04-18

## 2019-04-18 ENCOUNTER — TRANSCRIPTION ENCOUNTER (OUTPATIENT)
Age: 64
End: 2019-04-18

## 2019-04-18 VITALS
HEIGHT: 69 IN | DIASTOLIC BLOOD PRESSURE: 82 MMHG | BODY MASS INDEX: 24.29 KG/M2 | WEIGHT: 164 LBS | SYSTOLIC BLOOD PRESSURE: 147 MMHG | OXYGEN SATURATION: 98 % | HEART RATE: 49 BPM

## 2019-04-18 PROCEDURE — 93000 ELECTROCARDIOGRAM COMPLETE: CPT

## 2019-04-18 PROCEDURE — 99204 OFFICE O/P NEW MOD 45 MIN: CPT

## 2019-04-18 RX ORDER — TAMSULOSIN HYDROCHLORIDE 0.4 MG/1
0.4 CAPSULE ORAL
Qty: 90 | Refills: 3 | Status: DISCONTINUED | COMMUNITY
Start: 2019-03-21 | End: 2019-04-18

## 2019-04-18 NOTE — PHYSICAL EXAM
[General Appearance - Well Developed] : well developed [Normal Appearance] : normal appearance [Well Groomed] : well groomed [General Appearance - Well Nourished] : well nourished [No Deformities] : no deformities [Normal Conjunctiva] : the conjunctiva exhibited no abnormalities [General Appearance - In No Acute Distress] : no acute distress [Normal Oral Mucosa] : normal oral mucosa [Eyelids - No Xanthelasma] : the eyelids demonstrated no xanthelasmas [No Oral Cyanosis] : no oral cyanosis [No Oral Pallor] : no oral pallor [Normal Jugular Venous V Waves Present] : normal jugular venous V waves present [Normal Jugular Venous A Waves Present] : normal jugular venous A waves present [Heart Rate And Rhythm] : heart rate and rhythm were normal [No Jugular Venous Cotton A Waves] : no jugular venous octton A waves [Heart Sounds] : normal S1 and S2 [Murmurs] : no murmurs present [Respiration, Rhythm And Depth] : normal respiratory rhythm and effort [Exaggerated Use Of Accessory Muscles For Inspiration] : no accessory muscle use [Auscultation Breath Sounds / Voice Sounds] : lungs were clear to auscultation bilaterally [Abdomen Tenderness] : non-tender [Abdomen Soft] : soft [Abdomen Mass (___ Cm)] : no abdominal mass palpated [Abnormal Walk] : normal gait [Nail Clubbing] : no clubbing of the fingernails [Gait - Sufficient For Exercise Testing] : the gait was sufficient for exercise testing [Petechial Hemorrhages (___cm)] : no petechial hemorrhages [Cyanosis, Localized] : no localized cyanosis [Skin Color & Pigmentation] : normal skin color and pigmentation [No Venous Stasis] : no venous stasis [] : no rash [No Xanthoma] : no  xanthoma was observed [Skin Lesions] : no skin lesions [No Skin Ulcers] : no skin ulcer [Oriented To Time, Place, And Person] : oriented to person, place, and time [No Anxiety] : not feeling anxious [Mood] : the mood was normal [Affect] : the affect was normal

## 2019-04-18 NOTE — HISTORY OF PRESENT ILLNESS
[FreeTextEntry1] : Referring Physician: Yariel Dickson MD\par \par Dear Yariel:\par \par Mr. Chuck Gomes was seen in the [facility] today. For our records, please allow me to summarize the history and my findings.\par \par This pleasant 64 year old man has a cardiovascular history significant for hypertension, HL, diabetes and CHADSVASC 3 paroxysmal atrial fibrillation. He was first diagnosed with AF in 2016 following extensive abdominal surgery. He recurred spontaneously in spring 2018 with onset of chest pain and palpitations radiating to his arms and throat. He was again in atrial fibrillation in early March for much of a day. In between these episdoes he has had innumerable periods of non-sustained palpitations lasting anywhere from minutes to several hours. His symptoms have remained inadequately controlled on beta blockers, now with resting sinus rates in the low-50s limiting further titration.\par \par TTE (4/2018) showed grossly normal valvular and biventricular function. Nuclear stress testing (4/2018) showed no ischemia.\par \par

## 2019-05-08 ENCOUNTER — APPOINTMENT (OUTPATIENT)
Dept: CV DIAGNOSITCS | Facility: HOSPITAL | Age: 64
End: 2019-05-08

## 2019-05-31 ENCOUNTER — OUTPATIENT (OUTPATIENT)
Dept: OUTPATIENT SERVICES | Facility: HOSPITAL | Age: 64
LOS: 1 days | End: 2019-05-31

## 2019-05-31 ENCOUNTER — OUTPATIENT (OUTPATIENT)
Dept: OUTPATIENT SERVICES | Facility: HOSPITAL | Age: 64
LOS: 1 days | End: 2019-05-31
Payer: COMMERCIAL

## 2019-05-31 ENCOUNTER — APPOINTMENT (OUTPATIENT)
Dept: CV DIAGNOSITCS | Facility: HOSPITAL | Age: 64
End: 2019-05-31

## 2019-05-31 VITALS
RESPIRATION RATE: 14 BRPM | TEMPERATURE: 98 F | HEART RATE: 49 BPM | SYSTOLIC BLOOD PRESSURE: 165 MMHG | HEIGHT: 64 IN | WEIGHT: 164.02 LBS | OXYGEN SATURATION: 97 % | DIASTOLIC BLOOD PRESSURE: 72 MMHG

## 2019-05-31 DIAGNOSIS — K57.92 DIVERTICULITIS OF INTESTINE, PART UNSPECIFIED, WITHOUT PERFORATION OR ABSCESS WITHOUT BLEEDING: Chronic | ICD-10-CM

## 2019-05-31 DIAGNOSIS — I48.91 UNSPECIFIED ATRIAL FIBRILLATION: ICD-10-CM

## 2019-05-31 DIAGNOSIS — Z01.818 ENCOUNTER FOR OTHER PREPROCEDURAL EXAMINATION: ICD-10-CM

## 2019-05-31 DIAGNOSIS — Z90.49 ACQUIRED ABSENCE OF OTHER SPECIFIED PARTS OF DIGESTIVE TRACT: Chronic | ICD-10-CM

## 2019-05-31 LAB
ALBUMIN SERPL ELPH-MCNC: 4 G/DL — SIGNIFICANT CHANGE UP (ref 3.3–5)
ALP SERPL-CCNC: 54 U/L — SIGNIFICANT CHANGE UP (ref 40–120)
ALT FLD-CCNC: 17 U/L — SIGNIFICANT CHANGE UP (ref 10–45)
ANION GAP SERPL CALC-SCNC: 12 MMOL/L — SIGNIFICANT CHANGE UP (ref 5–17)
APTT BLD: 42.6 SEC — HIGH (ref 27.5–36.3)
AST SERPL-CCNC: 15 U/L — SIGNIFICANT CHANGE UP (ref 10–40)
BILIRUB SERPL-MCNC: 0.5 MG/DL — SIGNIFICANT CHANGE UP (ref 0.2–1.2)
BLD GP AB SCN SERPL QL: NEGATIVE — SIGNIFICANT CHANGE UP
BUN SERPL-MCNC: 26 MG/DL — HIGH (ref 7–23)
CALCIUM SERPL-MCNC: 9 MG/DL — SIGNIFICANT CHANGE UP (ref 8.4–10.5)
CHLORIDE SERPL-SCNC: 104 MMOL/L — SIGNIFICANT CHANGE UP (ref 96–108)
CO2 SERPL-SCNC: 22 MMOL/L — SIGNIFICANT CHANGE UP (ref 22–31)
CREAT SERPL-MCNC: 1.34 MG/DL — HIGH (ref 0.5–1.3)
GLUCOSE SERPL-MCNC: 237 MG/DL — HIGH (ref 70–99)
HCT VFR BLD CALC: 46.1 % — SIGNIFICANT CHANGE UP (ref 39–50)
HGB BLD-MCNC: 15.4 G/DL — SIGNIFICANT CHANGE UP (ref 13–17)
INR BLD: 1.23 RATIO — HIGH (ref 0.88–1.16)
MCHC RBC-ENTMCNC: 28.6 PG — SIGNIFICANT CHANGE UP (ref 27–34)
MCHC RBC-ENTMCNC: 33.3 GM/DL — SIGNIFICANT CHANGE UP (ref 32–36)
MCV RBC AUTO: 85.7 FL — SIGNIFICANT CHANGE UP (ref 80–100)
PLATELET # BLD AUTO: 213 K/UL — SIGNIFICANT CHANGE UP (ref 150–400)
POTASSIUM SERPL-MCNC: 4.4 MMOL/L — SIGNIFICANT CHANGE UP (ref 3.5–5.3)
POTASSIUM SERPL-SCNC: 4.4 MMOL/L — SIGNIFICANT CHANGE UP (ref 3.5–5.3)
PROT SERPL-MCNC: 7.4 G/DL — SIGNIFICANT CHANGE UP (ref 6–8.3)
PROTHROM AB SERPL-ACNC: 14.1 SEC — HIGH (ref 10–12.9)
RBC # BLD: 5.38 M/UL — SIGNIFICANT CHANGE UP (ref 4.2–5.8)
RBC # FLD: 13.3 % — SIGNIFICANT CHANGE UP (ref 10.3–14.5)
RH IG SCN BLD-IMP: POSITIVE — SIGNIFICANT CHANGE UP
SODIUM SERPL-SCNC: 138 MMOL/L — SIGNIFICANT CHANGE UP (ref 135–145)
WBC # BLD: 7.1 K/UL — SIGNIFICANT CHANGE UP (ref 3.8–10.5)
WBC # FLD AUTO: 7.1 K/UL — SIGNIFICANT CHANGE UP (ref 3.8–10.5)

## 2019-05-31 PROCEDURE — 86901 BLOOD TYPING SEROLOGIC RH(D): CPT

## 2019-05-31 PROCEDURE — 93306 TTE W/DOPPLER COMPLETE: CPT | Mod: 26

## 2019-05-31 PROCEDURE — 93010 ELECTROCARDIOGRAM REPORT: CPT

## 2019-05-31 PROCEDURE — 80053 COMPREHEN METABOLIC PANEL: CPT

## 2019-05-31 PROCEDURE — G0463: CPT

## 2019-05-31 PROCEDURE — 85730 THROMBOPLASTIN TIME PARTIAL: CPT

## 2019-05-31 PROCEDURE — 93005 ELECTROCARDIOGRAM TRACING: CPT

## 2019-05-31 PROCEDURE — 85610 PROTHROMBIN TIME: CPT

## 2019-05-31 PROCEDURE — 93306 TTE W/DOPPLER COMPLETE: CPT

## 2019-05-31 PROCEDURE — 93312 ECHO TRANSESOPHAGEAL: CPT | Mod: 26

## 2019-05-31 PROCEDURE — 85027 COMPLETE CBC AUTOMATED: CPT

## 2019-05-31 PROCEDURE — 93312 ECHO TRANSESOPHAGEAL: CPT

## 2019-05-31 PROCEDURE — 86900 BLOOD TYPING SEROLOGIC ABO: CPT

## 2019-05-31 PROCEDURE — 86850 RBC ANTIBODY SCREEN: CPT

## 2019-05-31 RX ORDER — RIVAROXABAN 15 MG-20MG
1 KIT ORAL
Qty: 0 | Refills: 0 | DISCHARGE

## 2019-05-31 RX ORDER — OXYCODONE HYDROCHLORIDE 5 MG/1
1 TABLET ORAL
Qty: 0 | Refills: 0 | DISCHARGE

## 2019-05-31 RX ORDER — FENOFIBRATE,MICRONIZED 130 MG
1 CAPSULE ORAL
Qty: 0 | Refills: 0 | DISCHARGE

## 2019-05-31 RX ORDER — AMLODIPINE BESYLATE AND OLMESARTRAN MEDOXOMIL 10; 40 MG/1; MG/1
1 TABLET, FILM COATED ORAL
Qty: 0 | Refills: 0 | DISCHARGE

## 2019-05-31 RX ORDER — INSULIN DETEMIR 100/ML (3)
56 INSULIN PEN (ML) SUBCUTANEOUS
Qty: 0 | Refills: 0 | DISCHARGE

## 2019-05-31 RX ORDER — INSULIN ASPART 100 [IU]/ML
10 INJECTION, SOLUTION SUBCUTANEOUS
Qty: 0 | Refills: 0 | DISCHARGE

## 2019-05-31 NOTE — H&P CARDIOLOGY - HISTORY OF PRESENT ILLNESS
63 y/o  male with PMHx of HTN, AF on Xarelto, DM type 2, HLD , BPH, renal cell carcinoma(s/p L nephrectomy), back pain(s/p disc fusion and spinal card stimulator implant), herniating disc, diverticulitis s/p bowel perforation in 2016 while vacationing in [place] s/p resection, presents today for PST and MARIA for the scheduled AF ablation on 6/3/19. Patient states he has AF for the past three years, had only medical management, now presents for AF ablation. Patient states he gets chest tightness and b/l arm tightness with Afib, but no palpitation. Was seen and evaluated by cardiologist, Dr. Dickson and recommends for AF ablation.

## 2019-05-31 NOTE — H&P CARDIOLOGY - PMH
Atrial fibrillation  Xarelto  Diabetes type 2, uncontrolled  2009  Elective surgery  Implantation of Lumbar Stimulator and Fusion for pain 2013  Hyperlipidemia    Hypertension    Prolapsed lumbar disc    Renal cell cancer  2003  Seizure  series of 3 seizures in 1978 after injection of dye for shoulder

## 2019-05-31 NOTE — H&P CARDIOLOGY - PSH
Acute diverticulitis    H/O rotator cuff surgery  1978  History of cholecystectomy  2015  History of renal carcinoma  Left Nephrectomy  S/P lumbar spinal fusion

## 2019-06-03 ENCOUNTER — OUTPATIENT (OUTPATIENT)
Dept: INPATIENT UNIT | Facility: HOSPITAL | Age: 64
LOS: 1 days | End: 2019-06-03
Payer: COMMERCIAL

## 2019-06-03 VITALS
DIASTOLIC BLOOD PRESSURE: 74 MMHG | RESPIRATION RATE: 15 BRPM | SYSTOLIC BLOOD PRESSURE: 138 MMHG | WEIGHT: 162.92 LBS | HEART RATE: 70 BPM | TEMPERATURE: 97 F | OXYGEN SATURATION: 96 % | HEIGHT: 69 IN

## 2019-06-03 DIAGNOSIS — Z90.49 ACQUIRED ABSENCE OF OTHER SPECIFIED PARTS OF DIGESTIVE TRACT: Chronic | ICD-10-CM

## 2019-06-03 DIAGNOSIS — I48.91 UNSPECIFIED ATRIAL FIBRILLATION: ICD-10-CM

## 2019-06-03 DIAGNOSIS — K57.92 DIVERTICULITIS OF INTESTINE, PART UNSPECIFIED, WITHOUT PERFORATION OR ABSCESS WITHOUT BLEEDING: Chronic | ICD-10-CM

## 2019-06-03 PROCEDURE — 93623 PRGRMD STIMJ&PACG IV RX NFS: CPT | Mod: 26

## 2019-06-03 PROCEDURE — 93656 COMPRE EP EVAL ABLTJ ATR FIB: CPT

## 2019-06-03 PROCEDURE — 93655 ICAR CATH ABLTJ DSCRT ARRHYT: CPT

## 2019-06-03 PROCEDURE — 93613 INTRACARDIAC EPHYS 3D MAPG: CPT

## 2019-06-03 PROCEDURE — 93657 TX L/R ATRIAL FIB ADDL: CPT

## 2019-06-03 PROCEDURE — 93662 INTRACARDIAC ECG (ICE): CPT | Mod: 26

## 2019-06-03 RX ORDER — DEXTROSE 50 % IN WATER 50 %
15 SYRINGE (ML) INTRAVENOUS ONCE
Refills: 0 | Status: DISCONTINUED | OUTPATIENT
Start: 2019-06-03 | End: 2019-06-04

## 2019-06-03 RX ORDER — FENOFIBRATE,MICRONIZED 130 MG
48 CAPSULE ORAL AT BEDTIME
Refills: 0 | Status: DISCONTINUED | OUTPATIENT
Start: 2019-06-03 | End: 2019-06-04

## 2019-06-03 RX ORDER — INSULIN LISPRO 100/ML
VIAL (ML) SUBCUTANEOUS
Refills: 0 | Status: DISCONTINUED | OUTPATIENT
Start: 2019-06-03 | End: 2019-06-04

## 2019-06-03 RX ORDER — OXYCODONE HYDROCHLORIDE 5 MG/1
10 TABLET ORAL EVERY 6 HOURS
Refills: 0 | Status: DISCONTINUED | OUTPATIENT
Start: 2019-06-03 | End: 2019-06-04

## 2019-06-03 RX ORDER — DEXTROSE 50 % IN WATER 50 %
25 SYRINGE (ML) INTRAVENOUS ONCE
Refills: 0 | Status: DISCONTINUED | OUTPATIENT
Start: 2019-06-03 | End: 2019-06-04

## 2019-06-03 RX ORDER — METOPROLOL TARTRATE 50 MG
100 TABLET ORAL EVERY 12 HOURS
Refills: 0 | Status: DISCONTINUED | OUTPATIENT
Start: 2019-06-03 | End: 2019-06-04

## 2019-06-03 RX ORDER — INSULIN LISPRO 100/ML
VIAL (ML) SUBCUTANEOUS AT BEDTIME
Refills: 0 | Status: DISCONTINUED | OUTPATIENT
Start: 2019-06-03 | End: 2019-06-04

## 2019-06-03 RX ORDER — METOPROLOL TARTRATE 50 MG
25 TABLET ORAL ONCE
Refills: 0 | Status: COMPLETED | OUTPATIENT
Start: 2019-06-03 | End: 2019-06-04

## 2019-06-03 RX ORDER — RIVAROXABAN 15 MG-20MG
20 KIT ORAL EVERY 24 HOURS
Refills: 0 | Status: DISCONTINUED | OUTPATIENT
Start: 2019-06-04 | End: 2019-06-04

## 2019-06-03 RX ORDER — SODIUM CHLORIDE 9 MG/ML
1000 INJECTION, SOLUTION INTRAVENOUS
Refills: 0 | Status: DISCONTINUED | OUTPATIENT
Start: 2019-06-03 | End: 2019-06-04

## 2019-06-03 RX ORDER — DEXTROSE 50 % IN WATER 50 %
12.5 SYRINGE (ML) INTRAVENOUS ONCE
Refills: 0 | Status: DISCONTINUED | OUTPATIENT
Start: 2019-06-03 | End: 2019-06-04

## 2019-06-03 RX ORDER — OXYCODONE HYDROCHLORIDE 5 MG/1
30 TABLET ORAL EVERY 12 HOURS
Refills: 0 | Status: DISCONTINUED | OUTPATIENT
Start: 2019-06-03 | End: 2019-06-04

## 2019-06-03 RX ORDER — INSULIN GLARGINE 100 [IU]/ML
30 INJECTION, SOLUTION SUBCUTANEOUS AT BEDTIME
Refills: 0 | Status: DISCONTINUED | OUTPATIENT
Start: 2019-06-03 | End: 2019-06-04

## 2019-06-03 RX ORDER — PANTOPRAZOLE SODIUM 20 MG/1
40 TABLET, DELAYED RELEASE ORAL
Refills: 0 | Status: DISCONTINUED | OUTPATIENT
Start: 2019-06-03 | End: 2019-06-04

## 2019-06-03 RX ORDER — AMLODIPINE BESYLATE 2.5 MG/1
10 TABLET ORAL DAILY
Refills: 0 | Status: DISCONTINUED | OUTPATIENT
Start: 2019-06-03 | End: 2019-06-04

## 2019-06-03 RX ORDER — DOCUSATE SODIUM 100 MG
100 CAPSULE ORAL
Refills: 0 | Status: DISCONTINUED | OUTPATIENT
Start: 2019-06-03 | End: 2019-06-04

## 2019-06-03 RX ORDER — GLUCAGON INJECTION, SOLUTION 0.5 MG/.1ML
1 INJECTION, SOLUTION SUBCUTANEOUS ONCE
Refills: 0 | Status: DISCONTINUED | OUTPATIENT
Start: 2019-06-03 | End: 2019-06-04

## 2019-06-03 RX ADMIN — Medication 100 MILLIGRAM(S): at 22:50

## 2019-06-03 RX ADMIN — Medication 48 MILLIGRAM(S): at 22:50

## 2019-06-03 RX ADMIN — INSULIN GLARGINE 30 UNIT(S): 100 INJECTION, SOLUTION SUBCUTANEOUS at 22:51

## 2019-06-03 NOTE — H&P ADULT - NSHPREVIEWOFSYSTEMS_GEN_ALL_CORE
REVIEW OF SYSTEMS:    CONSTITUTIONAL: No weakness, fevers or chills  EYES/ENT: No visual changes;  No vertigo or throat pain   NECK: No pain or stiffness  RESPIRATORY: No cough, wheezing, hemoptysis; No shortness of breath  CARDIOVASCULAR: No current chest pain or palpitations  GASTROINTESTINAL: No abdominal or epigastric pain. No nausea, vomiting, or hematemesis; No diarrhea or constipation. No melena or hematochezia.  GENITOURINARY: No dysuria, frequency or hematuria  NEUROLOGICAL: No numbness or weakness  SKIN: No itching, rashes

## 2019-06-03 NOTE — H&P ADULT - NSHPPHYSICALEXAM_GEN_ALL_CORE
Vital Signs Last 24 Hrs  T(C): 36.3 (03 Jun 2019 18:25), Max: 36.3 (03 Jun 2019 18:25)  T(F): 97.3 (03 Jun 2019 18:25), Max: 97.3 (03 Jun 2019 18:25)  HR: 70 (03 Jun 2019 18:30) (70 - 70)  BP: 141/71 (03 Jun 2019 18:30) (138/74 - 141/71)  BP(mean): 102 (03 Jun 2019 18:30) (102 - 115)  RR: 14 (03 Jun 2019 18:30) (14 - 15)  SpO2: 96% (03 Jun 2019 18:25) (96% - 96%)    PHYSICAL EXAM:  GENERAL: NAD, well-developed  HEAD:  Atraumatic, Normocephalic  EYES: conjunctiva and sclera clear  NECK: Supple, No JVD  CHEST/LUNG: Clear to auscultation bilaterally; No wheeze  HEART: Regular rate and rhythm; No murmurs, rubs, or gallops  ABDOMEN: Soft, Nontender, Nondistended; Bowel sounds present  EXTREMITIES:  2+ Peripheral Pulses, No clubbing, cyanosis, or edema  PSYCH: AAOx3  NEUROLOGY: non-focal  SKIN: No rashes or lesions Vital Signs Last 24 Hrs  T(C): 36.3 (03 Jun 2019 18:25), Max: 36.3 (03 Jun 2019 18:25)  T(F): 97.3 (03 Jun 2019 18:25), Max: 97.3 (03 Jun 2019 18:25)  HR: 70 (03 Jun 2019 18:30) (70 - 70)  BP: 141/71 (03 Jun 2019 18:30) (138/74 - 141/71)  BP(mean): 102 (03 Jun 2019 18:30) (102 - 115)  RR: 14 (03 Jun 2019 18:30) (14 - 15)  SpO2: 96% (03 Jun 2019 18:25) (96% - 96%)    PHYSICAL EXAM:  GENERAL: NAD, well-developed  HEAD:  Atraumatic, Normocephalic  EYES: conjunctiva and sclera clear  NECK: Supple, No JVD  CHEST/LUNG: Clear to auscultation bilaterally; No wheeze  HEART: Regular rate and rhythm; No murmurs, rubs, or gallops  ABDOMEN: Soft, Nontender, Nondistended; Bowel sounds present  EXTREMITIES:  2+ Peripheral Pulses, No clubbing, cyanosis, or edema  PSYCH: AAOx3  NEUROLOGY: non-focal  SKIN: No rashes or lesions, Right groin incision w/o signs of hematoma

## 2019-06-03 NOTE — H&P ADULT - NSICDXFAMILYHX_GEN_ALL_CORE_FT
FAMILY HISTORY:  Family history of brain tumor  Family history of cerebrovascular accident (CVA) in father  Family history of diabetes mellitus  Family history of lung cancer, mother    Sibling  Still living? Unknown  Family history of diabetes mellitus in sister, Age at diagnosis: Age Unknown  Family history of hypertension, Age at diagnosis: Age Unknown    Grandparent  Still living? Unknown  Family history of hypertension, Age at diagnosis: Age Unknown

## 2019-06-03 NOTE — H&P ADULT - ASSESSMENT
65 y/o  male with PMHx of HTN, AF on Xarelto, DM type 2, HLD , BPH, renal cell carcinoma(s/p L nephrectomy), back pain(s/p disc fusion and spinal card stimulator implant), herniating disc, diverticulitis s/p bowel perforation in 2016  s/p partial colectomy now admitted s/p AF ablation     #Neuro   No active issues  Continue home analgesics OxyER 30, Oxy IR 10prn    #CV  Afib s/p ablation, now sinus on tele  Suture removal at midnight  c/w xarelto  c/w metoprolol   #Resp 65 y/o  male with PMHx of HTN, AF on Xarelto, DM type 2, HLD , BPH, renal cell carcinoma(s/p L nephrectomy), back pain(s/p disc fusion and spinal card stimulator implant), herniating disc, diverticulitis s/p bowel perforation in 2016  s/p partial colectomy now admitted s/p AF ablation     #Neuro   No active issues  Continue home analgesics OxyER 30, Oxy IR 10prn    #CV  Afib s/p ablation, now sinus on tele  Suture removal at midnight  c/w xarelto to restart at midnight  c/w lopressor 100BID     #Resp  No active issues  Sating well on RA     #GI   c/w pantoprazole for secondary prevention s/p AFib ablation    #Renal  No active issues  Recheck BMP    #Endo  A1C 10 on April 2019  Unable to state a basal regimen, reportedly 56u previously but patient states this is not always consistent  Will give 30 lantus tonight  moderate dose correctional scale and adjust as needed    #DVT prophylaxis  On eliquis    Ozzy Cho MD - PGY1  Department of Internal Medicine  Pager - [phone]

## 2019-06-03 NOTE — H&P ADULT - NSICDXPASTMEDICALHX_GEN_ALL_CORE_FT
PAST MEDICAL HISTORY:  Atrial fibrillation Xarelto    Diabetes type 2, uncontrolled 2009    Elective surgery Implantation of Lumbar Stimulator and Fusion for pain 2013    Hyperlipidemia     Hypertension     Prolapsed lumbar disc     Renal cell cancer 2003    Seizure series of 3 seizures in 1978 after injection of dye for shoulder

## 2019-06-03 NOTE — H&P ADULT - NSICDXPASTSURGICALHX_GEN_ALL_CORE_FT
PAST SURGICAL HISTORY:  Acute diverticulitis     H/O rotator cuff surgery 1978    History of cholecystectomy 2015    History of renal carcinoma Left Nephrectomy    S/P lumbar spinal fusion

## 2019-06-03 NOTE — H&P ADULT - NSHPLABSRESULTS_GEN_ALL_CORE
LABS: All Labs Reviewed:  Labs from 5/31 reviewed       EKG: Sinus bradycardic in 50s, normal axis, no ST or T-wave abnormalities

## 2019-06-03 NOTE — H&P ADULT - HISTORY OF PRESENT ILLNESS
65 y/o  male with PMHx of HTN, AF on Xarelto, DM type 2, HLD , BPH, renal cell carcinoma(s/p L nephrectomy), back pain(s/p disc fusion and spinal card stimulator implant), herniating disc, diverticulitis s/p bowel perforation in 2016  s/p partial colectomy, presented s/p AF ablation today 6/3. Patient states he has AF for the past three years, had only medical management but was symptomatic. Patient states he gets chest tightness and b/l arm tightness with Afib, but no palpitation. Was seen and evaluated by cardiologist, Dr. Dickson and recommends for AF ablation.

## 2019-06-04 ENCOUNTER — TRANSCRIPTION ENCOUNTER (OUTPATIENT)
Age: 64
End: 2019-06-04

## 2019-06-04 VITALS
RESPIRATION RATE: 20 BRPM | OXYGEN SATURATION: 99 % | HEART RATE: 70 BPM | SYSTOLIC BLOOD PRESSURE: 120 MMHG | DIASTOLIC BLOOD PRESSURE: 67 MMHG

## 2019-06-04 DIAGNOSIS — I25.10 ATHEROSCLEROTIC HEART DISEASE OF NATIVE CORONARY ARTERY WITHOUT ANGINA PECTORIS: ICD-10-CM

## 2019-06-04 PROBLEM — I48.91 UNSPECIFIED ATRIAL FIBRILLATION: Chronic | Status: ACTIVE | Noted: 2018-04-06

## 2019-06-04 LAB
ANION GAP SERPL CALC-SCNC: 14 MMOL/L — SIGNIFICANT CHANGE UP (ref 5–17)
BUN SERPL-MCNC: 25 MG/DL — HIGH (ref 7–23)
CALCIUM SERPL-MCNC: 8.4 MG/DL — SIGNIFICANT CHANGE UP (ref 8.4–10.5)
CHLORIDE SERPL-SCNC: 106 MMOL/L — SIGNIFICANT CHANGE UP (ref 96–108)
CO2 SERPL-SCNC: 21 MMOL/L — LOW (ref 22–31)
CREAT SERPL-MCNC: 1.36 MG/DL — HIGH (ref 0.5–1.3)
GLUCOSE SERPL-MCNC: 159 MG/DL — HIGH (ref 70–99)
HBA1C BLD-MCNC: 10 % — HIGH (ref 4–5.6)
HCT VFR BLD CALC: 45.1 % — SIGNIFICANT CHANGE UP (ref 39–50)
HCV AB S/CO SERPL IA: 0.18 S/CO — SIGNIFICANT CHANGE UP (ref 0–0.99)
HCV AB SERPL-IMP: SIGNIFICANT CHANGE UP
HGB BLD-MCNC: 14.9 G/DL — SIGNIFICANT CHANGE UP (ref 13–17)
MAGNESIUM SERPL-MCNC: 1.9 MG/DL — SIGNIFICANT CHANGE UP (ref 1.6–2.6)
MCHC RBC-ENTMCNC: 28.9 PG — SIGNIFICANT CHANGE UP (ref 27–34)
MCHC RBC-ENTMCNC: 33.1 GM/DL — SIGNIFICANT CHANGE UP (ref 32–36)
MCV RBC AUTO: 87.3 FL — SIGNIFICANT CHANGE UP (ref 80–100)
PHOSPHATE SERPL-MCNC: 2.8 MG/DL — SIGNIFICANT CHANGE UP (ref 2.5–4.5)
PLATELET # BLD AUTO: 187 K/UL — SIGNIFICANT CHANGE UP (ref 150–400)
POTASSIUM SERPL-MCNC: 3.9 MMOL/L — SIGNIFICANT CHANGE UP (ref 3.5–5.3)
POTASSIUM SERPL-SCNC: 3.9 MMOL/L — SIGNIFICANT CHANGE UP (ref 3.5–5.3)
RBC # BLD: 5.16 M/UL — SIGNIFICANT CHANGE UP (ref 4.2–5.8)
RBC # FLD: 13.5 % — SIGNIFICANT CHANGE UP (ref 10.3–14.5)
SODIUM SERPL-SCNC: 141 MMOL/L — SIGNIFICANT CHANGE UP (ref 135–145)
WBC # BLD: 9.8 K/UL — SIGNIFICANT CHANGE UP (ref 3.8–10.5)
WBC # FLD AUTO: 9.8 K/UL — SIGNIFICANT CHANGE UP (ref 3.8–10.5)

## 2019-06-04 PROCEDURE — 83036 HEMOGLOBIN GLYCOSYLATED A1C: CPT

## 2019-06-04 PROCEDURE — 80048 BASIC METABOLIC PNL TOTAL CA: CPT

## 2019-06-04 PROCEDURE — 93655 ICAR CATH ABLTJ DSCRT ARRHYT: CPT

## 2019-06-04 PROCEDURE — 93623 PRGRMD STIMJ&PACG IV RX NFS: CPT

## 2019-06-04 PROCEDURE — 83735 ASSAY OF MAGNESIUM: CPT

## 2019-06-04 PROCEDURE — 86803 HEPATITIS C AB TEST: CPT

## 2019-06-04 PROCEDURE — 82962 GLUCOSE BLOOD TEST: CPT

## 2019-06-04 PROCEDURE — C1732: CPT

## 2019-06-04 PROCEDURE — 84100 ASSAY OF PHOSPHORUS: CPT

## 2019-06-04 PROCEDURE — C1894: CPT

## 2019-06-04 PROCEDURE — 93656 COMPRE EP EVAL ABLTJ ATR FIB: CPT

## 2019-06-04 PROCEDURE — 85027 COMPLETE CBC AUTOMATED: CPT

## 2019-06-04 PROCEDURE — C1759: CPT

## 2019-06-04 PROCEDURE — C1730: CPT

## 2019-06-04 PROCEDURE — 93662 INTRACARDIAC ECG (ICE): CPT

## 2019-06-04 PROCEDURE — 93613 INTRACARDIAC EPHYS 3D MAPG: CPT

## 2019-06-04 PROCEDURE — 93005 ELECTROCARDIOGRAM TRACING: CPT

## 2019-06-04 PROCEDURE — 93657 TX L/R ATRIAL FIB ADDL: CPT

## 2019-06-04 PROCEDURE — C1766: CPT

## 2019-06-04 RX ORDER — DOCUSATE SODIUM 100 MG
1 CAPSULE ORAL
Qty: 0 | Refills: 0 | DISCHARGE

## 2019-06-04 RX ORDER — CALCIUM CARBONATE 500(1250)
1 TABLET ORAL THREE TIMES A DAY
Refills: 0 | Status: DISCONTINUED | OUTPATIENT
Start: 2019-06-04 | End: 2019-06-04

## 2019-06-04 RX ORDER — MAGNESIUM SULFATE 500 MG/ML
1 VIAL (ML) INJECTION ONCE
Refills: 0 | Status: COMPLETED | OUTPATIENT
Start: 2019-06-04 | End: 2019-06-04

## 2019-06-04 RX ORDER — PANTOPRAZOLE SODIUM 20 MG/1
1 TABLET, DELAYED RELEASE ORAL
Qty: 30 | Refills: 0
Start: 2019-06-04 | End: 2019-07-03

## 2019-06-04 RX ORDER — POTASSIUM CHLORIDE 20 MEQ
20 PACKET (EA) ORAL ONCE
Refills: 0 | Status: COMPLETED | OUTPATIENT
Start: 2019-06-04 | End: 2019-06-04

## 2019-06-04 RX ADMIN — Medication 1 TABLET(S): at 08:45

## 2019-06-04 RX ADMIN — PANTOPRAZOLE SODIUM 40 MILLIGRAM(S): 20 TABLET, DELAYED RELEASE ORAL at 05:03

## 2019-06-04 RX ADMIN — Medication 20 MILLIEQUIVALENT(S): at 06:27

## 2019-06-04 RX ADMIN — RIVAROXABAN 20 MILLIGRAM(S): KIT at 00:53

## 2019-06-04 RX ADMIN — Medication 4: at 07:49

## 2019-06-04 RX ADMIN — Medication 100 MILLIGRAM(S): at 05:03

## 2019-06-04 RX ADMIN — Medication 100 GRAM(S): at 06:27

## 2019-06-04 RX ADMIN — Medication 25 MILLIGRAM(S): at 00:53

## 2019-06-04 RX ADMIN — AMLODIPINE BESYLATE 10 MILLIGRAM(S): 2.5 TABLET ORAL at 05:03

## 2019-06-04 RX ADMIN — OXYCODONE HYDROCHLORIDE 30 MILLIGRAM(S): 5 TABLET ORAL at 05:03

## 2019-06-04 NOTE — DISCHARGE NOTE PROVIDER - CARE PROVIDER_API CALL
Juan Phelps (MD)  Cardiac Electrophysiology; Cardiology; Internal Medicine  [address] [place] [address]  Phone: [phone]  Follow Up Time:

## 2019-06-04 NOTE — CONSULT NOTE ADULT - ASSESSMENT
64m HTN, AF on Xarelto, DM type 2, HLD , BPH, renal cell carcinoma(s/p L nephrectomy), back pain(s/p disc fusion and spinal card stimulator implant), herniating disc, diverticulitis s/p bowel perforation in 2016  s/p partial colectomy, presented for AF ablation 6/3. Patient states he has AF for the past three years, had only medical management but was symptomatic. Patient states he gets chest tightness and b/l arm tightness with Afib, but no palpitations. Was evaluated by Dr. Hammer and recommended for AF ablation.  This was performed yesterday without complication.    The patient reports reflux symptoms, which he has had before.  Denies chest discomfort and shortness of breath.  No abdominal pain.  No new neurologic symptoms.    -there is no evidence of acute ischemia.    -there is no evidence for meaningful  volume overload.    -maintaining sr s/p ablatiion  -cont ac     -monitor electrolytes, keep k>4, Mg>2  -dc planning with outpt followup with dr hammer in our office

## 2019-06-04 NOTE — PROGRESS NOTE ADULT - ASSESSMENT
65 y/o  male with PMHx of HTN, AF on Xarelto, DM type 2, HLD , BPH, renal cell carcinoma(s/p L nephrectomy), back pain(s/p disc fusion and spinal card stimulator implant), herniating disc, diverticulitis s/p bowel perforation in 2016  s/p partial colectomy now admitted s/p AF ablation     #Neuro   No active issues  Continue home analgesics OxyER 30, Oxy IR 10prn    #CV  Afib s/p ablation, now sinus on tele  Suture removed   c/w xarelto  c/w lopressor 100BID   likely d/c home today with EP follow-up       #Resp  No active issues  Sating well on RA     #GI   c/w pantoprazole for secondary prevention s/p AFib ablation    #Renal  No active issues  Recheck BMP    #Endo  A1C 10 on April 2019  c/w 30 lantus, moderate dose correctional scale and adjust as needed    #DVT prophylaxis  On eliquis    Ozzy Cho MD - PGY1  Department of Internal Medicine  Pager - [phone]

## 2019-06-04 NOTE — PROGRESS NOTE ADULT - SUBJECTIVE AND OBJECTIVE BOX
INTERVAL HPI/OVERNIGHT EVENTS:    SUBJECTIVE: Patient seen and examined at bedside.     OBJECTIVE: No acute events overnight. Complaining of some epigastric burning pain, resolved with pantoprazole. Otherwise no other complaints this AM     VITAL SIGNS:  ICU Vital Signs Last 24 Hrs  T(C): 36.7 (04 Jun 2019 07:00), Max: 36.7 (04 Jun 2019 07:00)  T(F): 98.1 (04 Jun 2019 07:00), Max: 98.1 (04 Jun 2019 07:00)  HR: 72 (04 Jun 2019 07:00) (50 - 72)  BP: 154/82 (04 Jun 2019 07:00) (110/57 - 157/72)  BP(mean): 119 (04 Jun 2019 07:00) (84 - 126)  ABP: --  ABP(mean): --  RR: 23 (04 Jun 2019 07:00) (9 - 23)  SpO2: 99% (04 Jun 2019 07:00) (95% - 99%)        06-03 @ 07:01  -  06-04 @ 07:00  --------------------------------------------------------  IN: 680 mL / OUT: 775 mL / NET: -95 mL      CAPILLARY BLOOD GLUCOSE      POCT Blood Glucose.: 203 mg/dL (04 Jun 2019 07:46)      PHYSICAL EXAM:    General: NAD  HEENT: NC/AT; PERRL, clear conjunctiva  Neck: supple  Respiratory: CTA b/l  Cardiovascular: +S1/S2; RRR  Abdomen: soft, NT/ND; +BS x4  Extremities: WWP, 2+ peripheral pulses b/l; no LE edema  Skin: normal color and turgor; no rash, groin incision nt, no ecchymosis, no hematoma   Neurological: AAOX3     MEDICATIONS:  MEDICATIONS  (STANDING):  amLODIPine   Tablet 10 milliGRAM(s) Oral daily  calcium carbonate    500 mG (Tums) Chewable 1 Tablet(s) Chew three times a day  dextrose 5%. 1000 milliLiter(s) (50 mL/Hr) IV Continuous <Continuous>  dextrose 50% Injectable 12.5 Gram(s) IV Push once  dextrose 50% Injectable 25 Gram(s) IV Push once  dextrose 50% Injectable 25 Gram(s) IV Push once  docusate sodium 100 milliGRAM(s) Oral two times a day  fenofibrate Tablet 48 milliGRAM(s) Oral at bedtime  insulin glargine Injectable (LANTUS) 30 Unit(s) SubCutaneous at bedtime  insulin lispro (HumaLOG) corrective regimen sliding scale   SubCutaneous three times a day before meals  insulin lispro (HumaLOG) corrective regimen sliding scale   SubCutaneous at bedtime  metoprolol tartrate 100 milliGRAM(s) Oral every 12 hours  oxyCODONE  ER Tablet 30 milliGRAM(s) Oral every 12 hours  pantoprazole    Tablet 40 milliGRAM(s) Oral before breakfast  rivaroxaban 20 milliGRAM(s) Oral every 24 hours    MEDICATIONS  (PRN):  dextrose 40% Gel 15 Gram(s) Oral once PRN Blood Glucose LESS THAN 70 milliGRAM(s)/deciliter  glucagon  Injectable 1 milliGRAM(s) IntraMuscular once PRN Glucose LESS THAN 70 milligrams/deciliter  oxyCODONE    IR 10 milliGRAM(s) Oral every 6 hours PRN Severe Pain (7 - 10)      ALLERGIES:  Allergies    aspirin (Other)  Cipro (Hives)  ciprofloxacin (Other (Unknown))  Dye (Seizure)  Eliquis (Rash)  penicillin (Hives)  penicillins (Other (Unknown))  statins (Other)    Intolerances        LABS:                        14.9   9.8   )-----------( 187      ( 04 Jun 2019 05:01 )             45.1     06-04    141  |  106  |  25<H>  ----------------------------<  159<H>  3.9   |  21<L>  |  1.36<H>    Ca    8.4      04 Jun 2019 05:01  Phos  2.8     06-04  Mg     1.9     06-04            RADIOLOGY & ADDITIONAL TESTS: Reviewed.

## 2019-06-04 NOTE — DISCHARGE NOTE PROVIDER - NSDCFUADDAPPT_GEN_ALL_CORE_FT
You have  scheduled appoint with Dr. Phelps on 7/15/2019 at 9:20am    Please see Dr. Dickson within 3 weeks after discharge from hospital.

## 2019-06-04 NOTE — DISCHARGE NOTE PROVIDER - HOSPITAL COURSE
63 y/o  male with PMHx of HTN, AF on Xarelto, DM type 2, HLD , BPH, renal cell carcinoma(s/p L nephrectomy), back pain(s/p disc fusion and spinal card stimulator implant), herniating disc, diverticulitis s/p bowel perforation in 2016 while vacationing in [place] s/p resection, presents today for PST and MARIA for the scheduled AF ablation on 6/3/19. Patient states he has AF for the past three years, had only medical management, now presents for AF ablation. Patient states he gets chest tightness and b/l arm tightness with Afib, but no palpitation. Was seen and evaluated by cardiologist, Dr. Dickson and recommends for AF ablation. Patient s/p Afib Ablation.

## 2019-06-04 NOTE — CONSULT NOTE ADULT - SUBJECTIVE AND OBJECTIVE BOX
[facility]         Maegan Mccoy, Ignacio, Michael, Yessi, Mirella Boggs        [phone] (office)    CHIEF COMPLAINT: Patient is a 64y old  Male who presents with a chief complaint of Afib ablation (04 Jun 2019 08:04)      HPI:  64m HTN, AF on Xarelto, DM type 2, HLD , BPH, renal cell carcinoma(s/p L nephrectomy), back pain(s/p disc fusion and spinal card stimulator implant), herniating disc, diverticulitis s/p bowel perforation in 2016  s/p partial colectomy, presented for AF ablation 6/3. Patient states he has AF for the past three years, had only medical management but was symptomatic. Patient states he gets chest tightness and b/l arm tightness with Afib, but no palpitations. Was evaluated by Dr. Dickson and recommended for AF ablation.  This was performed yesterday without complication.    The patient reports reflux symptoms, which he has had before.  Denies chest discomfort and shortness of breath.  No abdominal pain.  No new neurologic symptoms.        PAST MEDICAL & SURGICAL HISTORY:  Atrial fibrillation: Xarelto  Seizure: series of 3 seizures in 1978 after injection of dye for shoulder  Elective surgery: Implantation of Lumbar Stimulator and Fusion for pain 2013  Prolapsed lumbar disc  Renal cell cancer: 2003  Hyperlipidemia  Hypertension  Diabetes type 2, uncontrolled: 2009  Acute diverticulitis  History of cholecystectomy: 2015  H/O rotator cuff surgery: 1978  History of renal carcinoma: Left Nephrectomy  S/P lumbar spinal fusion      SOCIAL HISTORY: No active tobacco, alcohol or illicit drug use    FAMILY HISTORY:  Family history of cerebrovascular accident (CVA) in father  Family history of brain tumor  Family history of hypertension (Sibling, Grandparent)  Family history of lung cancer: mother  Family history of diabetes mellitus  Family history of diabetes mellitus in sister (Sibling)   No pertinent family history of CAD    Outpatient medications:    MEDICATIONS  (STANDING):  amLODIPine   Tablet 10 milliGRAM(s) Oral daily  calcium carbonate    500 mG (Tums) Chewable 1 Tablet(s) Chew three times a day  dextrose 5%. 1000 milliLiter(s) (50 mL/Hr) IV Continuous <Continuous>  dextrose 50% Injectable 12.5 Gram(s) IV Push once  dextrose 50% Injectable 25 Gram(s) IV Push once  dextrose 50% Injectable 25 Gram(s) IV Push once  docusate sodium 100 milliGRAM(s) Oral two times a day  fenofibrate Tablet 48 milliGRAM(s) Oral at bedtime  insulin glargine Injectable (LANTUS) 30 Unit(s) SubCutaneous at bedtime  insulin lispro (HumaLOG) corrective regimen sliding scale   SubCutaneous three times a day before meals  insulin lispro (HumaLOG) corrective regimen sliding scale   SubCutaneous at bedtime  metoprolol tartrate 100 milliGRAM(s) Oral every 12 hours  oxyCODONE  ER Tablet 30 milliGRAM(s) Oral every 12 hours  pantoprazole    Tablet 40 milliGRAM(s) Oral before breakfast  rivaroxaban 20 milliGRAM(s) Oral every 24 hours    MEDICATIONS  (PRN):  dextrose 40% Gel 15 Gram(s) Oral once PRN Blood Glucose LESS THAN 70 milliGRAM(s)/deciliter  glucagon  Injectable 1 milliGRAM(s) IntraMuscular once PRN Glucose LESS THAN 70 milligrams/deciliter  oxyCODONE    IR 10 milliGRAM(s) Oral every 6 hours PRN Severe Pain (7 - 10)      Allergies    aspirin (Other)  Cipro (Hives)  ciprofloxacin (Other (Unknown))  Dye (Seizure)  Eliquis (Rash)  penicillin (Hives)  penicillins (Other (Unknown))  statins (Other)    Intolerances        REVIEW OF SYSTEMS: Is negative for eye, ENT, GI, , allergic, dermatologic, musculoskeletal and neurologic, except as described above.    VITAL SIGNS:   Vital Signs Last 24 Hrs  T(C): 36.7 (04 Jun 2019 07:00), Max: 36.7 (04 Jun 2019 07:00)  T(F): 98.1 (04 Jun 2019 07:00), Max: 98.1 (04 Jun 2019 07:00)  HR: 70 (04 Jun 2019 10:00) (50 - 74)  BP: 120/67 (04 Jun 2019 10:00) (110/57 - 157/72)  BP(mean): 91 (04 Jun 2019 10:00) (84 - 126)  RR: 20 (04 Jun 2019 10:00) (9 - 28)  SpO2: 99% (04 Jun 2019 10:00) (95% - 99%)    I&O's Summary    03 Jun 2019 07:01  -  04 Jun 2019 07:00  --------------------------------------------------------  IN: 680 mL / OUT: 775 mL / NET: -95 mL        PHYSICAL EXAM:    Constitutional: NAD, awake and alert, well-developed  Eyes:  EOMI, no oral cyanosis, conjunctivae clear, anicteric.  Pulmonary: Non-labored, breath sounds are clear bilaterally, no wheezing, rales or rhonchi  Cardiovascular:  regular S1 and S2. No murmur.  No rubs, gallops or clicks  Gastrointestinal: Bowel Sounds present, soft, nontender.   Lymph: No peripheral edema.   Neurological: Alert, strength and sensitivity are grossly intact  Skin: No obvious lesions/rashes.   Psych:  Mood & affect appropriate .    LABS: All Labs Reviewed:                        14.9   9.8   )-----------( 187      ( 04 Jun 2019 05:01 )             45.1     04 Jun 2019 05:01    141    |  106    |  25     ----------------------------<  159    3.9     |  21     |  1.36     Ca    8.4        04 Jun 2019 05:01  Phos  2.8       04 Jun 2019 05:01  Mg     1.9       04 Jun 2019 05:01            Blood Culture:         RADIOLOGY:    EKG: sr

## 2019-06-04 NOTE — DISCHARGE NOTE NURSING/CASE MANAGEMENT/SOCIAL WORK - NSDCDPATPORTLINK_GEN_ALL_CORE
You can access the [facility] Portal, offered by [facility], by registering with the following website: [email or web address]

## 2019-06-04 NOTE — DISCHARGE NOTE PROVIDER - NSDCCPCAREPLAN_GEN_ALL_CORE_FT
PRINCIPAL DISCHARGE DIAGNOSIS  Diagnosis: Atrial fibrillation  Assessment and Plan of Treatment: Your heart rate and rhythm will be controlled.  * Appointments: Your doctor and healthcare team will speak with you about your plan of care. It is recommended that you have a follow-up appointment with your electrophysiology (EP) cardiologist (who performed the cardiac ablation). Your appointment date will depend on your own clinical situation, but will be within 1 month after discharge. If you did not receive an appointment before you leave the hospital, please call your EP cardiologist’s office the day after you are discharged home.   You are also asked to make an appointment to see your own private doctor after discharge.   * Catheter /Groin Site Care   You can take off the band-aid and shower in 24 hours. Keep the area clean and dry.   Do not put any ointments or lotions on the catheter/groin site.   No submersion of your catheter/groin site for 1 week.  This includes no sitting in bath tubs, pools, or any water for 1 week.   You may have mild soreness and black and blue at the groin site for a few days.   * Activity   No driving for 24 hours. For 24 hours after discharge, climb stairs only when absolutely necessary.   For 1 week: Do not do any strenuous activity or heavy lifting (no lifting anything greater than 10 pounds).  After 1 week, you may resume your regular physical activity.   You may feel some fatigue or chest soreness for the first 2 days after the ablation. Call your doctor if you experience severe pain or fatigue or if you have questions about how you are feeling.  * Medications   You may have new medications or changes in your medications. Your healthcare team will discuss this with you. Take your medications as prescribed at discharge. Please bring a list of your medications and all discharge paperwork to your next doctor’s appointment.  * Heart healthy choices: In addition to taking medications, aim for a heart healthy lifestyle. Some people find that their heartbeat and breathing may improve with healthy choices. Speak with your healthcare team about

## 2019-06-05 RX ORDER — COLCHICINE 0.6 MG/1
0.6 TABLET ORAL
Qty: 14 | Refills: 3 | Status: ACTIVE | COMMUNITY
Start: 1900-01-01 | End: 1900-01-01

## 2019-06-11 ENCOUNTER — APPOINTMENT (OUTPATIENT)
Dept: CARDIOLOGY | Facility: CLINIC | Age: 64
End: 2019-06-11
Payer: MEDICARE

## 2019-06-11 ENCOUNTER — NON-APPOINTMENT (OUTPATIENT)
Age: 64
End: 2019-06-11

## 2019-06-11 VITALS
SYSTOLIC BLOOD PRESSURE: 155 MMHG | HEIGHT: 69 IN | HEART RATE: 56 BPM | WEIGHT: 158 LBS | BODY MASS INDEX: 23.4 KG/M2 | DIASTOLIC BLOOD PRESSURE: 81 MMHG | OXYGEN SATURATION: 97 %

## 2019-06-11 DIAGNOSIS — I10 ESSENTIAL (PRIMARY) HYPERTENSION: ICD-10-CM

## 2019-06-11 DIAGNOSIS — I48.0 PAROXYSMAL ATRIAL FIBRILLATION: ICD-10-CM

## 2019-06-11 PROCEDURE — 99215 OFFICE O/P EST HI 40 MIN: CPT

## 2019-06-11 PROCEDURE — 93000 ELECTROCARDIOGRAM COMPLETE: CPT

## 2019-06-11 RX ORDER — SULFAMETHOXAZOLE AND TRIMETHOPRIM 800; 160 MG/1; MG/1
800-160 TABLET ORAL TWICE DAILY
Qty: 6 | Refills: 0 | Status: DISCONTINUED | COMMUNITY
End: 2019-06-11

## 2019-07-15 ENCOUNTER — APPOINTMENT (OUTPATIENT)
Dept: ELECTROPHYSIOLOGY | Facility: HOSPITAL | Age: 64
End: 2019-07-15

## 2020-03-25 ENCOUNTER — APPOINTMENT (OUTPATIENT)
Dept: UROLOGY | Facility: CLINIC | Age: 65
End: 2020-03-25

## 2020-11-05 ENCOUNTER — TRANSCRIPTION ENCOUNTER (OUTPATIENT)
Age: 65
End: 2020-11-05

## 2020-11-11 NOTE — ED ADULT TRIAGE NOTE - SOURCE OF INFORMATION
Patient Medical Necessity Clause: This procedure was medically necessary because the lesions that were treated were: Number Of Freeze-Thaw Cycles: 3 freeze-thaw cycles Post-Care Instructions: I reviewed with the patient in detail post-care instructions. Patient is to wear sunprotection, and avoid picking at any of the treated lesions. Pt may apply Vaseline to crusted or scabbing areas. Render Post-Care Instructions In Note?: no Detail Level: Zone Medical Necessity Information: It is in your best interest to select a reason for this procedure from the list below. All of these items fulfill various CMS LCD requirements except the new and changing color options. Consent: The patient's consent was obtained including but not limited to risks of crusting, scabbing, blistering, scarring, darker or lighter pigmentary change, recurrence, incomplete removal and infection.

## 2022-12-02 NOTE — ED ADULT NURSE NOTE - CAS TRG GEN SKIN CONDITION
ADULT ANNUAL PHYSICAL   [facility] PRIMARY CARE [facility]    NAME: Sony Martínez  AGE: 45 y o  SEX: male  : 1972     DATE: 2022     Assessment and Plan:     Problem List Items Addressed This Visit        Endocrine    Impaired fasting glucose     Lab Results   Component Value Date    HGBA1C 5 5 2022     Improved diet  Check labs  Continue with lifestyle modifications  Relevant Orders    Comprehensive metabolic panel (Completed)    HEMOGLOBIN A1C W/ EAG ESTIMATION (Completed)       Other    Mixed hyperlipidemia     Lab Results   Component Value Date    LDLCALC 229 (H) 2022     Very high LDL  Reviewed ASCVD Risk score 6 4%  Recommended statin  Patient has been improving diet/exercise  Likely to start statin pending upcoming labs and improvement with lifestyle management alone to determine dose  Relevant Orders    Lipid panel (Completed)    Vitamin D deficiency     Continue daily Vit D supplement pending repeat labs  Relevant Orders    Vitamin D 25 hydroxy (Completed)    Chronic left shoulder pain    Relevant Medications    naproxen (Naprosyn) 500 mg tablet    Other Relevant Orders    Ambulatory Referral to Physical Therapy   Other Visit Diagnoses     Annual physical exam    -  Primary    Need for hepatitis B screening test        Relevant Orders    Hepatitis B surface antibody (Completed)    Screening for malignant neoplasm of prostate        Relevant Orders    PSA, Total Screen (Completed)          Immunizations and preventive care screenings were discussed with patient today  Appropriate education was printed on patient's after visit summary  Discussed risks and benefits of prostate cancer screening  We discussed the controversial history of PSA screening for prostate cancer in the [place] as well as the risk of over detection and over treatment of prostate cancer by way of PSA screening    The patient understands that PSA blood testing is an imperfect way to screen for prostate cancer and that elevated PSA levels in the blood may also be caused by infection, inflammation, prostatic trauma or manipulation, urological procedures, or by benign prostatic enlargement  The role of the digital rectal examination in prostate cancer screening was also discussed and I discussed with him that there is large interobserver variability in the findings of digital rectal examination  Counseling:  Alcohol/drug use: discussed moderation in alcohol intake, the recommendations for healthy alcohol use, and avoidance of illicit drug use  Dental Health: discussed importance of regular tooth brushing, flossing, and dental visits  Injury prevention: discussed safety/seat belts, safety helmets, smoke detectors, carbon dioxide detectors, and smoking near bedding or upholstery  · Exercise: the importance of regular exercise/physical activity was discussed  Recommend exercise 3-5 times per week for at least 30 minutes  Return in about 6 months (around 6/2/2023)  Chief Complaint:     Chief Complaint   Patient presents with   • Physical Exam      History of Present Illness:     Adult Annual Physical   Patient here for a comprehensive physical exam  The patient reports problems - L upper arm/shoulder pain  x 1 month  He works as a   He exercises frequently and has a home gym  Pain is described as sharp at times  Not taking anything for pain  Some improvement with exercises  Diet and Physical Activity  · Diet/Nutrition: well balanced diet  · Exercise: 5-7 times a week on average  Home gym 1 hour a day     Depression Screening  PHQ-2/9 Depression Screening    Little interest or pleasure in doing things: 0 - not at all  Feeling down, depressed, or hopeless: 0 - not at all  PHQ-2 Score: 0  PHQ-2 Interpretation: Negative depression screen       General Health  · Sleep: sleeps well     · Hearing: normal - bilateral   · Vision: no vision problems  · Dental: regular dental visits   Health  · Symptoms include: erectile dysfunction     Review of Systems:     Review of Systems   Constitutional: Negative for chills and fever  HENT: Negative for ear pain and sore throat  Eyes: Negative for pain and visual disturbance  Respiratory: Negative for cough and shortness of breath  Cardiovascular: Negative for chest pain and palpitations  Gastrointestinal: Negative for abdominal pain and vomiting  Genitourinary: Negative for dysuria and hematuria  Musculoskeletal: Positive for arthralgias and myalgias  Negative for back pain  Skin: Negative for color change and rash  Neurological: Negative for seizures and syncope  All other systems reviewed and are negative       Past Medical History:     Past Medical History:   Diagnosis Date   • Elevated cholesterol    • Fingertip amputation     right 5th   • High cholesterol    • Keloid of skin 07/29/2019   • Pre-diabetes    • S/P cholecystectomy 01/14/2022      Past Surgical History:     Past Surgical History:   Procedure Laterality Date   • CHOLECYSTECTOMY LAPAROSCOPIC N/A 1/11/2022    Procedure: CHOLECYSTECTOMY LAPAROSCOPIC;  Surgeon: Grant Thompson MD;  [facility];  Service: General   • COLONOSCOPY        Family History:     Family History   Problem Relation Age of Onset   • Hypertension Mother    • No Known Problems Father    • No Known Problems Sister    • No Known Problems Brother    • No Known Problems Maternal Aunt    • No Known Problems Maternal Uncle    • No Known Problems Paternal Aunt    • No Known Problems Paternal Uncle    • No Known Problems Maternal Grandmother    • No Known Problems Maternal Grandfather    • No Known Problems Paternal Grandmother    • No Known Problems Paternal Grandfather    • ADD / ADHD Neg Hx    • Anesthesia problems Neg Hx    • Cancer Neg Hx    • Clotting disorder Neg Hx    • Collagen disease Neg Hx    • Diabetes Neg Hx    • Dislocations Neg Hx    • Learning disabilities Neg Hx    • Neurological problems Neg Hx    • Osteoporosis Neg Hx    • Rheumatologic disease Neg Hx    • Scoliosis Neg Hx    • Vascular Disease Neg Hx       Social History:     Social History     Socioeconomic History   • Marital status: /Civil Union     Spouse name: None   • Number of children: None   • Years of education: None   • Highest education level: None   Occupational History   • None   Tobacco Use   • Smoking status: Never   • Smokeless tobacco: Never   Vaping Use   • Vaping Use: Never used   Substance and Sexual Activity   • Alcohol use: Yes   • Drug use: No   • Sexual activity: Yes     Partners: Female     Birth control/protection: None   Other Topics Concern   • None   Social History Narrative   • None     Social Determinants of Health     Financial Resource Strain: Not on file   Food Insecurity: Not on file   Transportation Needs: Not on file   Physical Activity: Not on file   Stress: Not on file   Social Connections: Not on file   Intimate Partner Violence: Not on file   Housing Stability: Not on file      Current Medications:     Current Outpatient Medications   Medication Sig Dispense Refill   • naproxen (Naprosyn) 500 mg tablet Take 1 tablet (500 mg total) by mouth 2 (two) times a day with meals 30 tablet 0   • sildenafil (VIAGRA) 100 mg tablet Take 1 tablet (100 mg total) by mouth daily as needed for erectile dysfunction 15 tablet 0   • Vitamin D, Cholecalciferol, 50 MCG (2000 UT) CAPS Take 2,000 mg by mouth in the morning       No current facility-administered medications for this visit  Allergies:     No Known Allergies   Physical Exam:     /78 (BP Location: Left arm, Patient Position: Sitting, Cuff Size: Standard)   Pulse 80   Temp 99 1 °F (37 3 °C) (Tympanic)   Resp 20   Ht 5' 4" (1 626 m)   Wt 89 3 kg (196 lb 12 8 oz)   SpO2 95%   BMI 33 78 kg/m²     Physical Exam  Vitals and nursing note reviewed  Constitutional:       General: He is not in acute distress  Appearance: He is well-developed  HENT:      Head: Normocephalic and atraumatic  Eyes:      Conjunctiva/sclera: Conjunctivae normal    Cardiovascular:      Rate and Rhythm: Normal rate and regular rhythm  Heart sounds: No murmur heard  Pulmonary:      Effort: Pulmonary effort is normal  No respiratory distress  Breath sounds: Normal breath sounds  Abdominal:      Palpations: Abdomen is soft  Tenderness: There is no abdominal tenderness  Musculoskeletal:         General: No swelling  Cervical back: Neck supple  Comments: Negative empty can test b/l  5/5 strength in upper extremities b/l   Skin:     General: Skin is warm and dry  Capillary Refill: Capillary refill takes less than 2 seconds  Neurological:      Mental Status: He is alert     Psychiatric:         Mood and Affect: Mood normal           Sangita Velásquez PA-C  [address] Dry/Warm

## 2023-06-03 NOTE — H&P ADULT - BACK EXAM
Pt received semi-supine in bed, with all lines intact, NAD, all precautions maintained. BP: 160/68, HR: 68 Heterosexual paraspinal spasm L/paraspinal spasm R/sacroiliac tenderness L/sacroiliac tenderness R/normal/normal shape/vertebral tenderness

## 2024-02-02 NOTE — DISCUSSION/SUMMARY
Called and left message for patient regarding overdue INR status. Will follow up in 7 days.     [FreeTextEntry1] : In summary, this is a 64 year old man with CHADSVASC 3 paroxysmal AF despite beta blocker therapy. Options regarding management, including a rate control strategy with AV shana blocking agents, or rhythm control strategies employing anti-arrhythmic drugs and/or catheter ablation were reviewed. Given his young age and desire to minimize medications and pursue a "definitive" approach, I advised Mr. Gomes that an ablation approach would be a reasonable next option. I quoted him a 70-80% first procedure success rate.\par \par The rationale for the procedure as well as its risks--including but not limited to bleeding, vascular injury, pericardial effusion/tamponade, heart block requiring pacemaker, stroke, and death--were reviewed in detail. After consideration of this information, the decision was made to proceed with the procedure.\par \par He will undergo screening MARIA prior to ablation procedure.\par \par Mr. Gomes appeared to understand the whole discussion and verbalized that all of his questions were answered to his satisfaction.\par \par Thank you for allowing me to be involved in the care of this pleasant man. Please feel free to contact me with any questions.

## 2024-11-30 ENCOUNTER — NON-APPOINTMENT (OUTPATIENT)
Age: 69
End: 2024-11-30